# Patient Record
Sex: MALE | Race: WHITE | Employment: OTHER | URBAN - METROPOLITAN AREA
[De-identification: names, ages, dates, MRNs, and addresses within clinical notes are randomized per-mention and may not be internally consistent; named-entity substitution may affect disease eponyms.]

---

## 2019-07-25 ENCOUNTER — APPOINTMENT (OUTPATIENT)
Dept: CT IMAGING | Age: 64
DRG: 066 | End: 2019-07-25
Attending: EMERGENCY MEDICINE
Payer: COMMERCIAL

## 2019-07-25 ENCOUNTER — HOSPITAL ENCOUNTER (INPATIENT)
Age: 64
LOS: 2 days | Discharge: HOME HEALTH | DRG: 066 | End: 2019-07-27
Attending: EMERGENCY MEDICINE | Admitting: HOSPITALIST
Payer: COMMERCIAL

## 2019-07-25 ENCOUNTER — APPOINTMENT (OUTPATIENT)
Dept: MRI IMAGING | Age: 64
DRG: 066 | End: 2019-07-25
Attending: EMERGENCY MEDICINE
Payer: COMMERCIAL

## 2019-07-25 DIAGNOSIS — I63.9 CEREBROVASCULAR ACCIDENT (CVA), UNSPECIFIED MECHANISM (HCC): Primary | ICD-10-CM

## 2019-07-25 PROBLEM — F14.10 COCAINE ABUSE (HCC): Status: ACTIVE | Noted: 2019-07-25

## 2019-07-25 LAB
ALBUMIN SERPL-MCNC: 3.7 G/DL (ref 3.4–5)
ALBUMIN/GLOB SERPL: 1.1 {RATIO} (ref 0.8–1.7)
ALP SERPL-CCNC: 72 U/L (ref 45–117)
ALT SERPL-CCNC: 16 U/L (ref 16–61)
AMPHET UR QL SCN: NEGATIVE
ANION GAP SERPL CALC-SCNC: 4 MMOL/L (ref 3–18)
APPEARANCE UR: CLEAR
AST SERPL-CCNC: 13 U/L (ref 10–38)
ATRIAL RATE: 70 BPM
BARBITURATES UR QL SCN: NEGATIVE
BASOPHILS # BLD: 0 K/UL (ref 0–0.1)
BASOPHILS NFR BLD: 0 % (ref 0–2)
BENZODIAZ UR QL: NEGATIVE
BILIRUB SERPL-MCNC: 0.2 MG/DL (ref 0.2–1)
BILIRUB UR QL: NEGATIVE
BUN SERPL-MCNC: 28 MG/DL (ref 7–18)
BUN/CREAT SERPL: 27 (ref 12–20)
CALCIUM SERPL-MCNC: 9.1 MG/DL (ref 8.5–10.1)
CALCULATED P AXIS, ECG09: 43 DEGREES
CALCULATED R AXIS, ECG10: 15 DEGREES
CALCULATED T AXIS, ECG11: 40 DEGREES
CANNABINOIDS UR QL SCN: NEGATIVE
CHLORIDE SERPL-SCNC: 111 MMOL/L (ref 100–111)
CHOLEST SERPL-MCNC: 155 MG/DL
CK MB CFR SERPL CALC: NORMAL % (ref 0–4)
CK MB SERPL-MCNC: <1 NG/ML (ref 5–25)
CK SERPL-CCNC: 49 U/L (ref 39–308)
CO2 SERPL-SCNC: 27 MMOL/L (ref 21–32)
COCAINE UR QL SCN: POSITIVE
COLOR UR: YELLOW
CREAT SERPL-MCNC: 1.02 MG/DL (ref 0.6–1.3)
DIAGNOSIS, 93000: NORMAL
DIFFERENTIAL METHOD BLD: ABNORMAL
EOSINOPHIL # BLD: 0.2 K/UL (ref 0–0.4)
EOSINOPHIL NFR BLD: 2 % (ref 0–5)
ERYTHROCYTE [DISTWIDTH] IN BLOOD BY AUTOMATED COUNT: 14.9 % (ref 11.6–14.5)
ETHANOL SERPL-MCNC: <3 MG/DL (ref 0–3)
GLOBULIN SER CALC-MCNC: 3.5 G/DL (ref 2–4)
GLUCOSE BLD STRIP.AUTO-MCNC: 209 MG/DL (ref 70–110)
GLUCOSE SERPL-MCNC: 103 MG/DL (ref 74–99)
GLUCOSE UR STRIP.AUTO-MCNC: NEGATIVE MG/DL
HBA1C MFR BLD: 7 % (ref 4.2–5.6)
HCT VFR BLD AUTO: 46.1 % (ref 36–48)
HDLC SERPL-MCNC: 56 MG/DL (ref 40–60)
HDLC SERPL: 2.8 {RATIO} (ref 0–5)
HDSCOM,HDSCOM: ABNORMAL
HGB BLD-MCNC: 15.2 G/DL (ref 13–16)
HGB UR QL STRIP: NEGATIVE
KETONES UR QL STRIP.AUTO: NEGATIVE MG/DL
LDLC SERPL CALC-MCNC: 76 MG/DL (ref 0–100)
LEUKOCYTE ESTERASE UR QL STRIP.AUTO: NEGATIVE
LIPID PROFILE,FLP: NORMAL
LYMPHOCYTES # BLD: 2.6 K/UL (ref 0.9–3.6)
LYMPHOCYTES NFR BLD: 30 % (ref 21–52)
MCH RBC QN AUTO: 28 PG (ref 24–34)
MCHC RBC AUTO-ENTMCNC: 33 G/DL (ref 31–37)
MCV RBC AUTO: 85.1 FL (ref 74–97)
METHADONE UR QL: NEGATIVE
MONOCYTES # BLD: 0.7 K/UL (ref 0.05–1.2)
MONOCYTES NFR BLD: 8 % (ref 3–10)
NEUTS SEG # BLD: 5.2 K/UL (ref 1.8–8)
NEUTS SEG NFR BLD: 60 % (ref 40–73)
NITRITE UR QL STRIP.AUTO: NEGATIVE
OPIATES UR QL: NEGATIVE
P-R INTERVAL, ECG05: 152 MS
PCP UR QL: NEGATIVE
PH UR STRIP: 5 [PH] (ref 5–8)
PLATELET # BLD AUTO: 213 K/UL (ref 135–420)
PMV BLD AUTO: 11.7 FL (ref 9.2–11.8)
POTASSIUM SERPL-SCNC: 4.4 MMOL/L (ref 3.5–5.5)
PROT SERPL-MCNC: 7.2 G/DL (ref 6.4–8.2)
PROT UR STRIP-MCNC: NEGATIVE MG/DL
Q-T INTERVAL, ECG07: 388 MS
QRS DURATION, ECG06: 84 MS
QTC CALCULATION (BEZET), ECG08: 419 MS
RBC # BLD AUTO: 5.42 M/UL (ref 4.7–5.5)
SODIUM SERPL-SCNC: 142 MMOL/L (ref 136–145)
SP GR UR REFRACTOMETRY: 1.02 (ref 1–1.03)
TRIGL SERPL-MCNC: 115 MG/DL (ref ?–150)
TROPONIN I SERPL-MCNC: <0.02 NG/ML (ref 0–0.04)
UROBILINOGEN UR QL STRIP.AUTO: 1 EU/DL (ref 0.2–1)
VENTRICULAR RATE, ECG03: 70 BPM
VLDLC SERPL CALC-MCNC: 23 MG/DL
WBC # BLD AUTO: 8.7 K/UL (ref 4.6–13.2)

## 2019-07-25 PROCEDURE — 96360 HYDRATION IV INFUSION INIT: CPT

## 2019-07-25 PROCEDURE — 83036 HEMOGLOBIN GLYCOSYLATED A1C: CPT

## 2019-07-25 PROCEDURE — 82550 ASSAY OF CK (CPK): CPT

## 2019-07-25 PROCEDURE — 99285 EMERGENCY DEPT VISIT HI MDM: CPT

## 2019-07-25 PROCEDURE — 80061 LIPID PANEL: CPT

## 2019-07-25 PROCEDURE — 81003 URINALYSIS AUTO W/O SCOPE: CPT

## 2019-07-25 PROCEDURE — 74011636320 HC RX REV CODE- 636/320: Performed by: EMERGENCY MEDICINE

## 2019-07-25 PROCEDURE — 74011636637 HC RX REV CODE- 636/637: Performed by: HOSPITALIST

## 2019-07-25 PROCEDURE — 85025 COMPLETE CBC W/AUTO DIFF WBC: CPT

## 2019-07-25 PROCEDURE — 80307 DRUG TEST PRSMV CHEM ANLYZR: CPT

## 2019-07-25 PROCEDURE — 70496 CT ANGIOGRAPHY HEAD: CPT

## 2019-07-25 PROCEDURE — 74011250636 HC RX REV CODE- 250/636: Performed by: EMERGENCY MEDICINE

## 2019-07-25 PROCEDURE — 74011250636 HC RX REV CODE- 250/636: Performed by: HOSPITALIST

## 2019-07-25 PROCEDURE — 93005 ELECTROCARDIOGRAM TRACING: CPT

## 2019-07-25 PROCEDURE — 70551 MRI BRAIN STEM W/O DYE: CPT

## 2019-07-25 PROCEDURE — 70450 CT HEAD/BRAIN W/O DYE: CPT

## 2019-07-25 PROCEDURE — 65660000000 HC RM CCU STEPDOWN

## 2019-07-25 PROCEDURE — 80053 COMPREHEN METABOLIC PANEL: CPT

## 2019-07-25 PROCEDURE — 82962 GLUCOSE BLOOD TEST: CPT

## 2019-07-25 PROCEDURE — 74011250637 HC RX REV CODE- 250/637: Performed by: EMERGENCY MEDICINE

## 2019-07-25 RX ORDER — INSULIN LISPRO 100 [IU]/ML
INJECTION, SOLUTION INTRAVENOUS; SUBCUTANEOUS
COMMUNITY
Start: 2017-05-02

## 2019-07-25 RX ORDER — IBUPROFEN 200 MG
1 TABLET ORAL DAILY
Status: DISCONTINUED | OUTPATIENT
Start: 2019-07-26 | End: 2019-07-27 | Stop reason: HOSPADM

## 2019-07-25 RX ORDER — SODIUM CHLORIDE 9 MG/ML
100 INJECTION, SOLUTION INTRAVENOUS CONTINUOUS
Status: DISCONTINUED | OUTPATIENT
Start: 2019-07-25 | End: 2019-07-27 | Stop reason: HOSPADM

## 2019-07-25 RX ORDER — GUAIFENESIN 100 MG/5ML
81 LIQUID (ML) ORAL DAILY
Status: DISCONTINUED | OUTPATIENT
Start: 2019-07-26 | End: 2019-07-26

## 2019-07-25 RX ORDER — INSULIN LISPRO 100 [IU]/ML
INJECTION, SOLUTION INTRAVENOUS; SUBCUTANEOUS
Status: DISCONTINUED | OUTPATIENT
Start: 2019-07-25 | End: 2019-07-27 | Stop reason: HOSPADM

## 2019-07-25 RX ORDER — ASPIRIN 325 MG
325 TABLET ORAL
Status: COMPLETED | OUTPATIENT
Start: 2019-07-25 | End: 2019-07-25

## 2019-07-25 RX ORDER — ACETAMINOPHEN 500 MG
1000 TABLET ORAL ONCE
Status: COMPLETED | OUTPATIENT
Start: 2019-07-25 | End: 2019-07-25

## 2019-07-25 RX ORDER — ATORVASTATIN CALCIUM 20 MG/1
80 TABLET, FILM COATED ORAL DAILY
Status: DISCONTINUED | OUTPATIENT
Start: 2019-07-26 | End: 2019-07-27 | Stop reason: HOSPADM

## 2019-07-25 RX ORDER — ENOXAPARIN SODIUM 100 MG/ML
40 INJECTION SUBCUTANEOUS EVERY 24 HOURS
Status: DISCONTINUED | OUTPATIENT
Start: 2019-07-25 | End: 2019-07-27 | Stop reason: HOSPADM

## 2019-07-25 RX ORDER — MAGNESIUM SULFATE 100 %
16 CRYSTALS MISCELLANEOUS AS NEEDED
Status: DISCONTINUED | OUTPATIENT
Start: 2019-07-25 | End: 2019-07-27 | Stop reason: HOSPADM

## 2019-07-25 RX ORDER — INSULIN LISPRO 100 [IU]/ML
5 INJECTION, SOLUTION INTRAVENOUS; SUBCUTANEOUS
Status: DISCONTINUED | OUTPATIENT
Start: 2019-07-25 | End: 2019-07-27 | Stop reason: HOSPADM

## 2019-07-25 RX ADMIN — ACETAMINOPHEN 1000 MG: 500 TABLET ORAL at 14:55

## 2019-07-25 RX ADMIN — ENOXAPARIN SODIUM 40 MG: 40 INJECTION SUBCUTANEOUS at 19:02

## 2019-07-25 RX ADMIN — IOPAMIDOL 100 ML: 755 INJECTION, SOLUTION INTRAVENOUS at 17:09

## 2019-07-25 RX ADMIN — INSULIN LISPRO 4 UNITS: 100 INJECTION, SOLUTION INTRAVENOUS; SUBCUTANEOUS at 21:46

## 2019-07-25 RX ADMIN — SODIUM CHLORIDE 1000 ML: 900 INJECTION, SOLUTION INTRAVENOUS at 13:16

## 2019-07-25 RX ADMIN — SODIUM CHLORIDE 100 ML/HR: 900 INJECTION, SOLUTION INTRAVENOUS at 19:02

## 2019-07-25 RX ADMIN — ASPIRIN 325 MG ORAL TABLET 325 MG: 325 PILL ORAL at 15:04

## 2019-07-25 NOTE — PROGRESS NOTES
Received call from Zuri E Joel Mercedes MD who states \"No large vessel occlusion. \"  Primary RNBrandan Arm made aware.

## 2019-07-25 NOTE — ED NOTES
NEUROLOGY PRELIMINARY NOTE    Patient: Sergei Myrick        Sex: male          DOA: 7/25/2019  YOB: 1955      Age:  58 y.o.         LOS: 0 days       Comment: Discussed the case with the attending, Dr. Gibson Key. The patient seems to have embolic infarcts in several distributions. This may well be due to cocaine use. Initial recommendations:  Please use stroke admission order sets. 1. Aspirin 325mg po now, and then Aspirin 81mg po daily afterwards. 2. Admit to telemetry with tele monitoring  3. Neuro checks per stroke protocol  4. Permissive hypertension (do not treat if BP is not >200/110, prefer prn labetolol 5mg)  5. IV normal saline continuous infusion 100-125 ml/h  6. Euglycemia with sliding scale insulin  7. Euthermia with acetaminophen 650mg Q6h prn for fever  8. Avoid urinary catheters please. 9. CTA of head and neck  10. Echocardiogram with bubble study  11. Lipid panel  12. Hemoglobin A1c  13. SCDs and DVT prophylaxis   14. PT/OT and SLP consults    Further recommendations to follow.     Signed:  Maryse Elizabeth MD  7/25/2019  4:51 PM

## 2019-07-25 NOTE — ROUTINE PROCESS
TRANSFER - IN REPORT:    Verbal report received from Mark HAND RN(name) on Terrell Peed  being received from Halie CHAVARRIA RN(unit) for routine progression of care      Report consisted of patients Situation, Background, Assessment and   Recommendations(SBAR). Information from the following report(s) SBAR, Kardex, ED Summary, Procedure Summary, Intake/Output, MAR and Accordion was reviewed with the receiving nurse. Opportunity for questions and clarification was provided. Assessment completed upon patients arrival to unit and care assumed.

## 2019-07-25 NOTE — H&P
History & Physical    Patient: Roosevelt Wilkins MRN: [id number]  CSN: [id number]    YOB: 1955  Age: 58 y.o. Sex: male      DOA: 7/25/2019  Primary Care Provider:  Camila Calderón MD      Assessment/Plan     Patient Active Problem List   Diagnosis Code    Stroke ([facility] [address] I63.9    Diabetes ([facility] [address] E11.9    Cocaine abuse ([facility] [address] F14.10       Admit to telemetry  Acute cerebellar CVA MRI showed Several foci of acute infarction in the right cerebellar hemisphere and  cerebellar vermis with an additional focus in the right thalamus and a small focus in the left frontal lobe. CTA of head and neck  Echo 2d doppler   Get fasting lipid panel, HbA1c   Continue with antiplatelet agent   Will use antihypertensive only if blood pressure systolic is above 436 and diastolic above 910   Regular neurochecks q4 hrly   Start the patient on iv fluids   Will keep the patient on telemetry to rule out any arrythmias     DM - continue levemir, pre meal insulin, start on diabetic diet and SSI. Cocaine abuse - counseled. DVT prophylaxis    PT/OT      Estimated length of stay : 1-2 days    CC: dizziness, ataxia       HPI:     Roosevelt Wilkins is a 58 y.o. male who has past history of DM, presents to ER with concerns of dizziness and vertigo along with gait problem. Symptoms started about a week ago. He woke up disoriented and confused, he had fall and had to crawl to bathroom, where he vomited 3 times. Other symptoms include difficulty walking, nausea and headache. He reports that he used crack cocaine for symptoms but did not help. He denies any fever/chills, chest pain, SOB. Denies unilateral weakness or numbness. Smokes 2ppd. In ER CT showed acute/subacute CVA involving cerebellar area. Past Medical History:   Diagnosis Date    Diabetes ([facility] [address]     Stroke ([facility] [address]        No past surgical history on file. No family history on file.     Social History     Socioeconomic History    Marital status: SINGLE Spouse name: Not on file    Number of children: Not on file    Years of education: Not on file    Highest education level: Not on file   Tobacco Use    Smoking status: Current Every Day Smoker     Packs/day: 3.00    Smokeless tobacco: Never Used   Substance and Sexual Activity    Alcohol use: Never     Frequency: Never    Drug use: Yes     Types: Cocaine       Prior to Admission medications    Medication Sig Start Date End Date Taking? Authorizing Provider   insulin lispro (HUMALOG KWIKPEN INSULIN) 100 unit/mL kwikpen inject 5 units subcutaneously before meals 17  Yes Other, MD Francisco J   insulin detemir U-100 (LEVEMIR FLEXTOUCH U-100 INSULN) 100 unit/mL (3 mL) inpn inject 10 units subcutaneously every evening 17  Yes Other, MD Francisco J       No Known Allergies    Review of Systems  Gen: No fever, chills, malaise, weight loss/gain. Heent: No headache, rhinorrhea, epistaxis, ear pain, hearing loss, sinus pain, neck pain/stiffness, sore throat. Heart: No chest pain, palpitations, WATSON, pnd, or orthopnea. Resp: No cough, hemoptysis, wheezing and shortness of breath. GI: No nausea, vomiting, diarrhea, constipation, melena or hematochezia. : No urinary obstruction, dysuria or hematuria. Derm: No rash, new skin lesion or pruritis. Musc/skeletal: no bone or joint complains. Vasc: No edema, cyanosis or claudication. Endo: No heat/cold intolerance, no polyuria,polydipsia or polyphagia. Neuro: see above   Heme: No easy bruising or bleeding. Physical Exam:     Physical Exam:  Visit Vitals  /72 (BP 1 Location: Right arm)   Pulse 74   Temp 98.1 °F (36.7 °C)   Resp 18   Ht 5' 9\" (1.753 m)   Wt 86.2 kg (190 lb)   SpO2 98%   BMI 28.06 kg/m²      O2 Device: Room air    Temp (24hrs), Av.1 °F (36.7 °C), Min:97.9 °F (36.6 °C), Max:98.4 °F (36.9 °C)     07 -  1900  In: 1000 [I.V.:1000]  Out: 300 [Urine:300]   No intake/output data recorded.     General:  Awake, cooperative, no distress. Head:  Normocephalic, without obvious abnormality, atraumatic. Eyes:  Conjunctivae/corneas clear, sclera anicteric, PERRL, EOMs intact. Nose: Nares normal. No drainage or sinus tenderness. Throat: Lips, mucosa, and tongue normal.    Neck: Supple, symmetrical, trachea midline, no adenopathy. Lungs:   Clear to auscultation bilaterally. Heart:   S1, S2, no murmur, click, rub or gallop. Abdomen: Soft, non-tender. Bowel sounds normal. No masses,  No organomegaly. Extremities: Extremities normal, atraumatic, no cyanosis or edema. Capillary refill normal.   Pulses: 2+ and symmetric all extremities. Skin: Skin color pink, turgor normal. No rashes or lesions         Neurological Exam:   Mental Status:  Alert , co-operative , memory intact . Cranial Nerves:  Intact visual fields. PERRL, EOM's full, no nystagmus, no ptosis. Facial sensation is normal. Facial movement is symmetric. Palate is midline. Normal sternocleidomastoid strength. Tongue is midline. Hearing is intact bilaterally. Motor:  5/5 strength in upper and lower proximal and distal muscles. Normal bulk and tone. Reflexes:  Deep tendon reflexes 2+/4 and symmetrical.    Sensory:  Normal and symmetric bilaterally. Gait:  Not tested. Cerebellar:  No cerebellar signs present.             Labs Reviewed:    CMP:   Lab Results   Component Value Date/Time     07/25/2019 12:20 PM    K 4.4 07/25/2019 12:20 PM     07/25/2019 12:20 PM    CO2 27 07/25/2019 12:20 PM    AGAP 4 07/25/2019 12:20 PM     (H) 07/25/2019 12:20 PM    BUN 28 (H) 07/25/2019 12:20 PM    CREA 1.02 07/25/2019 12:20 PM    GFRAA >60 07/25/2019 12:20 PM    GFRNA >60 07/25/2019 12:20 PM    CA 9.1 07/25/2019 12:20 PM    ALB 3.7 07/25/2019 12:20 PM    TP 7.2 07/25/2019 12:20 PM    GLOB 3.5 07/25/2019 12:20 PM    AGRAT 1.1 07/25/2019 12:20 PM    SGOT 13 07/25/2019 12:20 PM    ALT 16 07/25/2019 12:20 PM     CBC:   Lab Results   Component Value Date/Time    WBC 8.7 07/25/2019 12:20 PM    HGB 15.2 07/25/2019 12:20 PM    HCT 46.1 07/25/2019 12:20 PM     07/25/2019 12:20 PM     All Cardiac Markers in the last 24 hours:   Lab Results   Component Value Date/Time    CPK 49 07/25/2019 12:20 PM    CKMB <1.0 07/25/2019 12:20 PM    CKND1  07/25/2019 12:20 PM     CALCULATION NOT PERFORMED WHEN RESULT IS BELOW LINEAR LIMIT    Sushma Sparks <0.02 07/25/2019 12:20 PM         Procedures/imaging: see electronic medical records for all procedures/Xrays and details which were not copied into this note but were reviewed prior to creation of Plan        CC: Camila Calderón MD

## 2019-07-25 NOTE — ED TRIAGE NOTES
Patient with complaints of a headache, dizziness and falling to the left side for 1 week.  Patient states that he was sent here by PCP

## 2019-07-25 NOTE — ED PROVIDER NOTES
EMERGENCY DEPARTMENT HISTORY AND PHYSICAL EXAM    Date: 7/25/2019  Patient Name: Leticia Pond    History of Presenting Illness     Chief Complaint   Patient presents with    Gait Problem    Dizziness         History Provided By: Patient      Leticia Pond is a 58 y.o. male with PMHX of diabetes who presents to the emergency department C/O dizziness and vision spinning. Patient notes that this started 1 week ago it caused him to fall he notes that nothing changes it that the room is constantly spinning. It does not get better. He notes that he did try to use crack cocaine which helped the headache but did not improve the spinning. He has never had anything like this before. Nothing makes it better or worse. He denies any associated shortness of breath or chest pain he endorses nausea but denies vomiting he denies urinary symptoms he notes when he walks he has a slight lean to the left but denies weakness. He otherwise denies urinary symptoms alcohol or other drug use. Gadiel Salinas PCP: Galileo Whitaker MD    Current Outpatient Medications   Medication Sig Dispense Refill    insulin lispro (HUMALOG KWIKPEN INSULIN) 100 unit/mL kwikpen inject 5 units subcutaneously before meals      insulin detemir U-100 (LEVEMIR FLEXTOUCH U-100 INSULN) 100 unit/mL (3 mL) inpn inject 10 units subcutaneously every evening         Past History     Past Medical History:  Past Medical History:   Diagnosis Date    Diabetes ([address]     Stroke [facility])        Past Surgical History:  No past surgical history on file. Family History:  No family history on file. Social History:  Social History     Tobacco Use    Smoking status: Current Every Day Smoker    Smokeless tobacco: Never Used   Substance Use Topics    Alcohol use: Never     Frequency: Never    Drug use: Not on file       Allergies:  No Known Allergies      Review of Systems   Review of Systems   All other systems reviewed and are negative.     Negative unless stated above    Physical Exam     Vitals:    07/25/19 1300 07/25/19 1400 07/25/19 1457 07/25/19 1539   BP: 136/79 (!) 172/92 163/82 144/74   Pulse: 66 64 64    Resp: 17 13 18    Temp:       SpO2:       Weight:       Height:         Physical Exam    Nursing notes and vital signs reviewed    Constitutional: Chronically ill-appearing moderate distress  Head: Normocephalic, Atraumatic  Eyes: Pupils are equal, round, and reactive to light, EOMI no evidence of nystagmus  Neck: Supple  Cardiovascular: Regular rate and rhythm,   Chest: Normal work of breathing and chest excursion bilaterally  Lungs: Clear to ausculation bilaterally  Abdomen: Soft, non tender, non distended, normoactive bowel sounds  Back: No evidence of trauma or deformity  Extremities: No evidence of trauma or deformity, no LE edema  Skin: Warm and dry, normal cap refill  Neuro: Alert and appropriate, normal cranial nerve exam normal speech, strength and sensation full and symmetric bilaterally, gait with a slight lean to the left when walking, normal finger-nose-finger abnormal heel shin  Psychiatric: Normal mood and affect      Diagnostic Study Results     Labs -     Recent Results (from the past 12 hour(s))   EKG, 12 LEAD, INITIAL    Collection Time: 07/25/19 12:19 PM   Result Value Ref Range    Ventricular Rate 70 BPM    Atrial Rate 70 BPM    P-R Interval 152 ms    QRS Duration 84 ms    Q-T Interval 388 ms    QTC Calculation (Bezet) 419 ms    Calculated P Axis 43 degrees    Calculated R Axis 15 degrees    Calculated T Axis 40 degrees    Diagnosis       Normal sinus rhythm with sinus arrhythmia  Normal ECG  No previous ECGs available     CBC WITH AUTOMATED DIFF    Collection Time: 07/25/19 12:20 PM   Result Value Ref Range    WBC 8.7 4.6 - 13.2 K/uL    RBC 5.42 4.70 - 5.50 M/uL    HGB 15.2 13.0 - 16.0 g/dL    HCT 46.1 36.0 - 48.0 %    MCV 85.1 74.0 - 97.0 FL    MCH 28.0 24.0 - 34.0 PG    MCHC 33.0 31.0 - 37.0 g/dL    RDW 14.9 (H) 11.6 - 14.5 %    PLATELET 772 135 - 420 K/uL    MPV 11.7 9.2 - 11.8 FL    NEUTROPHILS 60 40 - 73 %    LYMPHOCYTES 30 21 - 52 %    MONOCYTES 8 3 - 10 %    EOSINOPHILS 2 0 - 5 %    BASOPHILS 0 0 - 2 %    ABS. NEUTROPHILS 5.2 1.8 - 8.0 K/UL    ABS. LYMPHOCYTES 2.6 0.9 - 3.6 K/UL    ABS. MONOCYTES 0.7 0.05 - 1.2 K/UL    ABS. EOSINOPHILS 0.2 0.0 - 0.4 K/UL    ABS. BASOPHILS 0.0 0.0 - 0.1 K/UL    DF AUTOMATED     METABOLIC PANEL, COMPREHENSIVE    Collection Time: 07/25/19 12:20 PM   Result Value Ref Range    Sodium 142 136 - 145 mmol/L    Potassium 4.4 3.5 - 5.5 mmol/L    Chloride 111 100 - 111 mmol/L    CO2 27 21 - 32 mmol/L    Anion gap 4 3.0 - 18 mmol/L    Glucose 103 (H) 74 - 99 mg/dL    BUN 28 (H) 7.0 - 18 MG/DL    Creatinine 1.02 0.6 - 1.3 MG/DL    BUN/Creatinine ratio 27 (H) 12 - 20      GFR est AA >60 >60 ml/min/1.73m2    GFR est non-AA >60 >60 ml/min/1.73m2    Calcium 9.1 8.5 - 10.1 MG/DL    Bilirubin, total 0.2 0.2 - 1.0 MG/DL    ALT (SGPT) 16 16 - 61 U/L    AST (SGOT) 13 10 - 38 U/L    Alk.  phosphatase 72 45 - 117 U/L    Protein, total 7.2 6.4 - 8.2 g/dL    Albumin 3.7 3.4 - 5.0 g/dL    Globulin 3.5 2.0 - 4.0 g/dL    A-G Ratio 1.1 0.8 - 1.7     CARDIAC PANEL,(CK, CKMB & TROPONIN)    Collection Time: 07/25/19 12:20 PM   Result Value Ref Range    CK 49 39 - 308 U/L    CK - MB <1.0 <3.6 ng/ml    CK-MB Index  0.0 - 4.0 %     CALCULATION NOT PERFORMED WHEN RESULT IS BELOW LINEAR LIMIT    Troponin-I, QT <0.02 0.0 - 0.045 NG/ML   ETHYL ALCOHOL    Collection Time: 07/25/19 12:20 PM   Result Value Ref Range    ALCOHOL(ETHYL),SERUM <3 0 - 3 MG/DL   URINALYSIS W/ RFLX MICROSCOPIC    Collection Time: 07/25/19  1:14 PM   Result Value Ref Range    Color YELLOW      Appearance CLEAR      Specific gravity 1.022 1.005 - 1.030      pH (UA) 5.0 5.0 - 8.0      Protein NEGATIVE  NEG mg/dL    Glucose NEGATIVE  NEG mg/dL    Ketone NEGATIVE  NEG mg/dL    Bilirubin NEGATIVE  NEG      Blood NEGATIVE  NEG      Urobilinogen 1.0 0.2 - 1.0 EU/dL    Nitrites NEGATIVE NEG      Leukocyte Esterase NEGATIVE  NEG     DRUG SCREEN, URINE    Collection Time: 07/25/19  1:14 PM   Result Value Ref Range    BENZODIAZEPINES NEGATIVE  NEG      BARBITURATES NEGATIVE  NEG      THC (TH-CANNABINOL) NEGATIVE  NEG      OPIATES NEGATIVE  NEG      PCP(PHENCYCLIDINE) NEGATIVE  NEG      COCAINE POSITIVE (A) NEG      AMPHETAMINES NEGATIVE  NEG      METHADONE NEGATIVE  NEG      HDSCOM (NOTE)        Radiologic Studies -   CT HEAD WO CONT   Final Result   IMPRESSION:      1. Acute/subacute focal area of loss of the gray-white matter junction is   present in the medial posterior aspect of the right cerebellum, concerning for   evolving infarct. No acute intracranial hemorrhage. Recommend MRI of the brain   for further evaluation. MRI BRAIN WO CONT    (Results Pending)   CTA HEAD NECK W WO CONT    (Results Pending)     CT Results  (Last 48 hours)               07/25/19 1422  CT HEAD WO CONT Final result    Impression:  IMPRESSION:       1. Acute/subacute focal area of loss of the gray-white matter junction is   present in the medial posterior aspect of the right cerebellum, concerning for   evolving infarct. No acute intracranial hemorrhage. Recommend MRI of the brain   for further evaluation. Narrative:  EXAM: CT head       INDICATION: Headache and dizziness for one week. COMPARISON: None. TECHNIQUE: Axial CT imaging of the head was performed without intravenous   contrast. One or more dose reduction techniques were used on this CT: automated   exposure control, adjustment of the mAs and/or kVp according to patient size,   and iterative reconstruction techniques. The specific techniques used on this   CT exam have been documented in the patient's electronic medical record.     Digital Imaging and Communications in Medicine (DICOM) format image data are   available to nonaffiliated external healthcare facilities or entities on a   secured, media free, reciprocally searchable basis with patient authorization   for at least a 12-month period after this study. _______________       FINDINGS:       BRAIN AND POSTERIOR FOSSA: There is no intracranial hemorrhage, mass effect, or   midline shift. Acute/subacute appearing focal area of loss of gray-white matter   junction is present in the medial posterior aspect of the right cerebellum. EXTRA-AXIAL SPACES AND MENINGES: There are no abnormal extra-axial fluid   collections. CALVARIUM: Intact. SINUSES: Clear. OTHER: None.       _______________               CXR Results  (Last 48 hours)    None          Medications given in the ED-  Medications   sodium chloride 0.9 % bolus infusion 1,000 mL (0 mL IntraVENous IV Completed 7/25/19 1416)   acetaminophen (TYLENOL) tablet 1,000 mg (1,000 mg Oral Given 7/25/19 1455)   aspirin tablet 325 mg (325 mg Oral Given 7/25/19 1504)         Medical Decision Making   I am the first provider for this patient. I reviewed the vital signs, available nursing notes, past medical history, past surgical history, family history and social history. Vital Signs-Reviewed the patient's vital signs. Pulse Oximetry Analysis - 100% on ra     Cardiac Monitor:  Rate: 66 bpm  Rhythm: nsr    EKG interpretation: (Preliminary)  EKG read by Dr. Iwona Sosa 70 normal sinus rhythm    Records Reviewed: Nursing Notes, Old Medical Records and Previous Laboratory Studies    Provider Notes (Medical Decision Making): Cristy Love is a 58 y.o. male history of hypertension and substance abuse who presents today with possible cerebellar stroke symptoms. Patient is outside of the TPA window as symptoms started 1 week ago. CT scan was concerning for cerebellar stroke. I spoke to Dr. Elma Prieto of neurology who agreed and an MRI was ordered. MRI confirms stroke a full dose aspirin was given and a CTA was ordered per neurology recommendations patient was informed of the findings.   Laboratory work and EKG as above he will be admitted to the medical service under Dr. Ryder Ayers. For further treatment. Procedures:  Procedures      Diagnosis and Disposition       Core Measures:  For Hospitalized Patients:    1. Hospitalization Decision Time:  The decision to hospitalize the patient was made by Dr. Arelis Crowder on 7/25/2019    2. Aspirin: Aspirin was given    9:34 AM  Patient is being admitted to the hospital by Dr. Ryder Ayers. The results of their tests and reasons for their admission have been discussed with them and/or available family. They convey agreement and understanding for the need to be admitted and for their admission diagnosis. CONDITIONS ON ADMISSION:  Sepsis is not present at the time of admission. Deep Vein Thrombosis is not present at the time of admission. .  Wound infection is not present at the time of admission. Pressure Ulcer is not present at the time of admission. CLINICAL IMPRESSION:    1. Cerebrovascular accident (CVA), unspecified mechanism ([facility] [address]      _______________________________      Please note that this dictation was completed with [employer], the computer voice recognition software. Quite often unanticipated grammatical, syntax, homophones, and other interpretive errors are inadvertently transcribed by the computer software. Please disregard these errors. Please excuse any errors that have escaped final proofreading.

## 2019-07-26 ENCOUNTER — APPOINTMENT (OUTPATIENT)
Dept: NON INVASIVE DIAGNOSTICS | Age: 64
DRG: 066 | End: 2019-07-26
Attending: HOSPITALIST
Payer: COMMERCIAL

## 2019-07-26 LAB
ANION GAP SERPL CALC-SCNC: 9 MMOL/L (ref 3–18)
BUN SERPL-MCNC: 27 MG/DL (ref 7–18)
BUN/CREAT SERPL: 28 (ref 12–20)
CALCIUM SERPL-MCNC: 8.2 MG/DL (ref 8.5–10.1)
CHLORIDE SERPL-SCNC: 112 MMOL/L (ref 100–111)
CO2 SERPL-SCNC: 25 MMOL/L (ref 21–32)
CREAT SERPL-MCNC: 0.98 MG/DL (ref 0.6–1.3)
ECHO AO ROOT DIAM: 3.49 CM
ECHO AV AREA PEAK VELOCITY: 3 CM2
ECHO AV AREA VTI: 3.4 CM2
ECHO AV AREA/BSA PEAK VELOCITY: 1.5 CM2/M2
ECHO AV AREA/BSA VTI: 1.7 CM2/M2
ECHO AV MEAN GRADIENT: 5.5 MMHG
ECHO AV PEAK GRADIENT: 10.7 MMHG
ECHO AV PEAK VELOCITY: 163.49 CM/S
ECHO AV VTI: 32.64 CM
ECHO IVC PROX: 2.07 CM
ECHO LA MAJOR AXIS: 4.24 CM
ECHO LA VOL 2C: 83.72 ML (ref 18–58)
ECHO LA VOL 4C: 53.48 ML (ref 18–58)
ECHO LA VOL BP: 75.47 ML (ref 18–58)
ECHO LA VOL/BSA BIPLANE: 37.34 ML/M2 (ref 16–28)
ECHO LA VOLUME INDEX A2C: 41.42 ML/M2 (ref 16–28)
ECHO LA VOLUME INDEX A4C: 26.46 ML/M2 (ref 16–28)
ECHO LV E' LATERAL VELOCITY: 11 CM/S
ECHO LV E' SEPTAL VELOCITY: 10 CM/S
ECHO LV EDV A2C: 67.6 ML
ECHO LV EDV A4C: 86.6 ML
ECHO LV EDV BP: 83.6 ML (ref 67–155)
ECHO LV EDV INDEX A4C: 42.8 ML/M2
ECHO LV EDV INDEX BP: 41.4 ML/M2
ECHO LV EDV NDEX A2C: 33.4 ML/M2
ECHO LV EJECTION FRACTION A2C: 63 %
ECHO LV EJECTION FRACTION A4C: 59 %
ECHO LV EJECTION FRACTION BIPLANE: 60.8 % (ref 55–100)
ECHO LV ESV A2C: 25 ML
ECHO LV ESV A4C: 35.6 ML
ECHO LV ESV BP: 32.8 ML (ref 22–58)
ECHO LV ESV INDEX A2C: 12.4 ML/M2
ECHO LV ESV INDEX A4C: 17.6 ML/M2
ECHO LV ESV INDEX BP: 16.2 ML/M2
ECHO LV INTERNAL DIMENSION DIASTOLIC: 5.09 CM (ref 4.2–5.9)
ECHO LV INTERNAL DIMENSION SYSTOLIC: 2.93 CM
ECHO LV IVSD: 1.17 CM (ref 0.6–1)
ECHO LV MASS 2D: 275.5 G (ref 88–224)
ECHO LV MASS INDEX 2D: 136.3 G/M2 (ref 49–115)
ECHO LV POSTERIOR WALL DIASTOLIC: 1.16 CM (ref 0.6–1)
ECHO LVOT DIAM: 2.11 CM
ECHO LVOT PEAK GRADIENT: 7.9 MMHG
ECHO LVOT PEAK VELOCITY: 140.7 CM/S
ECHO LVOT VTI: 31.88 CM
ECHO MV A VELOCITY: 117.6 CM/S
ECHO MV AREA PHT: 4.7 CM2
ECHO MV E DECELERATION TIME (DT): 162 MS
ECHO MV E VELOCITY: 107.95 CM/S
ECHO MV E/A RATIO: 0.92
ECHO MV E/E' LATERAL: 9.81
ECHO MV E/E' RATIO (AVERAGED): 10.3
ECHO MV E/E' SEPTAL: 10.8
ECHO MV PRESSURE HALF TIME (PHT): 47 MS
ECHO PULMONARY ARTERY SYSTOLIC PRESSURE (PASP): 25 MMHG
ECHO PV MAX VELOCITY: 103.77 CM/S
ECHO PV PEAK GRADIENT: 4.3 MMHG
ECHO RA AREA 4C: 12.12 CM2
ECHO RV INTERNAL DIMENSION: 3.72 CM
ECHO TRICUSPID ANNULAR PEAK SYSTOLIC VELOCITY: 2.9 CM/S
ECHO TV REGURGITANT MAX VELOCITY: 225.77 CM/S
ECHO TV REGURGITANT PEAK GRADIENT: 20.4 MMHG
ERYTHROCYTE [DISTWIDTH] IN BLOOD BY AUTOMATED COUNT: 14.8 % (ref 11.6–14.5)
GLUCOSE BLD STRIP.AUTO-MCNC: 127 MG/DL (ref 70–110)
GLUCOSE BLD STRIP.AUTO-MCNC: 193 MG/DL (ref 70–110)
GLUCOSE BLD STRIP.AUTO-MCNC: 211 MG/DL (ref 70–110)
GLUCOSE BLD STRIP.AUTO-MCNC: 261 MG/DL (ref 70–110)
GLUCOSE SERPL-MCNC: 208 MG/DL (ref 74–99)
HCT VFR BLD AUTO: 42.5 % (ref 36–48)
HGB BLD-MCNC: 13.7 G/DL (ref 13–16)
MCH RBC QN AUTO: 27.5 PG (ref 24–34)
MCHC RBC AUTO-ENTMCNC: 32.2 G/DL (ref 31–37)
MCV RBC AUTO: 85.2 FL (ref 74–97)
PLATELET # BLD AUTO: 173 K/UL (ref 135–420)
PMV BLD AUTO: 11.4 FL (ref 9.2–11.8)
POTASSIUM SERPL-SCNC: 4.2 MMOL/L (ref 3.5–5.5)
RBC # BLD AUTO: 4.99 M/UL (ref 4.7–5.5)
SODIUM SERPL-SCNC: 146 MMOL/L (ref 136–145)
WBC # BLD AUTO: 6.2 K/UL (ref 4.6–13.2)

## 2019-07-26 PROCEDURE — 74011000250 HC RX REV CODE- 250

## 2019-07-26 PROCEDURE — 85027 COMPLETE CBC AUTOMATED: CPT

## 2019-07-26 PROCEDURE — 74011250637 HC RX REV CODE- 250/637: Performed by: PSYCHIATRY & NEUROLOGY

## 2019-07-26 PROCEDURE — 74011000250 HC RX REV CODE- 250: Performed by: HOSPITALIST

## 2019-07-26 PROCEDURE — 82962 GLUCOSE BLOOD TEST: CPT

## 2019-07-26 PROCEDURE — 97161 PT EVAL LOW COMPLEX 20 MIN: CPT

## 2019-07-26 PROCEDURE — 36415 COLL VENOUS BLD VENIPUNCTURE: CPT

## 2019-07-26 PROCEDURE — 93306 TTE W/DOPPLER COMPLETE: CPT

## 2019-07-26 PROCEDURE — 65660000000 HC RM CCU STEPDOWN

## 2019-07-26 PROCEDURE — 80048 BASIC METABOLIC PNL TOTAL CA: CPT

## 2019-07-26 PROCEDURE — 74011250636 HC RX REV CODE- 250/636: Performed by: HOSPITALIST

## 2019-07-26 PROCEDURE — 74011636637 HC RX REV CODE- 636/637: Performed by: HOSPITALIST

## 2019-07-26 PROCEDURE — 74011250637 HC RX REV CODE- 250/637: Performed by: FAMILY MEDICINE

## 2019-07-26 PROCEDURE — 74011250637 HC RX REV CODE- 250/637: Performed by: HOSPITALIST

## 2019-07-26 RX ORDER — ASPIRIN 325 MG
325 TABLET, DELAYED RELEASE (ENTERIC COATED) ORAL DAILY
Status: DISCONTINUED | OUTPATIENT
Start: 2019-07-26 | End: 2019-07-27 | Stop reason: HOSPADM

## 2019-07-26 RX ORDER — SODIUM CHLORIDE 9 MG/ML
INJECTION INTRAMUSCULAR; INTRAVENOUS; SUBCUTANEOUS
Status: COMPLETED
Start: 2019-07-26 | End: 2019-07-26

## 2019-07-26 RX ORDER — ACETAMINOPHEN 325 MG/1
650 TABLET ORAL
Status: DISCONTINUED | OUTPATIENT
Start: 2019-07-26 | End: 2019-07-27 | Stop reason: HOSPADM

## 2019-07-26 RX ORDER — SODIUM CHLORIDE 9 MG/ML
30 INJECTION INTRAMUSCULAR; INTRAVENOUS; SUBCUTANEOUS ONCE
Status: COMPLETED | OUTPATIENT
Start: 2019-07-26 | End: 2019-07-26

## 2019-07-26 RX ORDER — INSULIN GLARGINE 100 [IU]/ML
10 INJECTION, SOLUTION SUBCUTANEOUS EVERY EVENING
Status: DISCONTINUED | OUTPATIENT
Start: 2019-07-26 | End: 2019-07-27 | Stop reason: HOSPADM

## 2019-07-26 RX ADMIN — INSULIN LISPRO 6 UNITS: 100 INJECTION, SOLUTION INTRAVENOUS; SUBCUTANEOUS at 23:34

## 2019-07-26 RX ADMIN — SODIUM CHLORIDE: 9 INJECTION, SOLUTION INTRAMUSCULAR; INTRAVENOUS; SUBCUTANEOUS at 11:00

## 2019-07-26 RX ADMIN — INSULIN LISPRO 3 UNITS: 100 INJECTION, SOLUTION INTRAVENOUS; SUBCUTANEOUS at 11:53

## 2019-07-26 RX ADMIN — ENOXAPARIN SODIUM 40 MG: 40 INJECTION SUBCUTANEOUS at 18:59

## 2019-07-26 RX ADMIN — ASPIRIN 325 MG: 325 TABLET, DELAYED RELEASE ORAL at 11:51

## 2019-07-26 RX ADMIN — INSULIN LISPRO 9 UNITS: 100 INJECTION, SOLUTION INTRAVENOUS; SUBCUTANEOUS at 06:33

## 2019-07-26 RX ADMIN — INSULIN GLARGINE 10 UNITS: 100 INJECTION, SOLUTION SUBCUTANEOUS at 18:59

## 2019-07-26 RX ADMIN — INSULIN LISPRO 5 UNITS: 100 INJECTION, SOLUTION INTRAVENOUS; SUBCUTANEOUS at 07:30

## 2019-07-26 RX ADMIN — ATORVASTATIN CALCIUM 80 MG: 20 TABLET, FILM COATED ORAL at 11:51

## 2019-07-26 RX ADMIN — SODIUM CHLORIDE 100 ML/HR: 900 INJECTION, SOLUTION INTRAVENOUS at 23:52

## 2019-07-26 RX ADMIN — SODIUM CHLORIDE 30 ML: 9 INJECTION, SOLUTION INTRAMUSCULAR; INTRAVENOUS; SUBCUTANEOUS at 13:26

## 2019-07-26 RX ADMIN — INSULIN LISPRO 5 UNITS: 100 INJECTION, SOLUTION INTRAVENOUS; SUBCUTANEOUS at 16:30

## 2019-07-26 RX ADMIN — ACETAMINOPHEN 650 MG: 325 TABLET ORAL at 13:27

## 2019-07-26 RX ADMIN — SODIUM CHLORIDE 100 ML/HR: 900 INJECTION, SOLUTION INTRAVENOUS at 04:44

## 2019-07-26 RX ADMIN — INSULIN LISPRO 5 UNITS: 100 INJECTION, SOLUTION INTRAVENOUS; SUBCUTANEOUS at 11:52

## 2019-07-26 NOTE — PROGRESS NOTES
Reason for Admission:   Domenica Reardon is a 58 y.o. male who has past history of DM, presents to ER with concerns of dizziness and vertigo along with gait problem. Symptoms started about a week ago. He woke up disoriented and confused, he had fall and had to crawl to bathroom, where he vomited 3 times. Other symptoms include difficulty walking, nausea and headache. He reports that he used crack cocaine for symptoms but did not help. He denies any fever/chills, chest pain, SOB. Denies unilateral weakness or numbness. Smokes 2ppd. In ER CT showed acute/subacute CVA involving cerebellar area.                       RRAT Score:   6                  Plan for utilizing home health:      yes                    Current Advanced Directive/Advance Care Plan: please consider placing consult to palliatiave/pastoral care to address ACP                         Transition of Care Plan:      Met with patient at bedside along with Dr. Minna Gates. Patient states he lives with his son. He has [employer] for insurance. States his pcp is  [facility]. Patient to be evaluated by PT. Iris Cunha Patients only c/o is dizziness. Reports he is usually independent. He denies other needs from cm he will need follow up appointments with pcp. Cm wlll continue to follow  Care Management Interventions  PCP Verified by CM:  Yes  Transition of Care Consult (CM Consult): Discharge Planning

## 2019-07-26 NOTE — CONSULTS
NEUROLOGY CONSULTATION NOTE    Patient: Vivian Lim MRN: [id number]  CSN: [id number]    YOB: 1955  Age: 58 y.o. Sex: male    DOA: 7/25/2019 LOS:  LOS: 1 day        Requesting Physician: Dr. Mandy Renteria  Reason for Consultation: cerebrovascular accident              HISTORY OF PRESENT ILLNESS:   Vivian Lim is a 71-year-old gentleman with history of diabetes, prior CVA with speech problems, depression and heavy tobacco use (3 ppd since he was 14), who presented to hospital with a week of dizziness and balance problems. He woke up with extreme dizziness and needed to vomit several times a week ago when the symptoms started. He was not able to walk study and had extreme dizziness. He also had some headaches. He denies lateralized weakness or numbness. He tells me that he used cocaine in order to help with the headaches after the symptoms started. He uses cocaine intermittently and last time he is cocaine was several weeks before the symptoms started. His prior CVA involved speech problems but he was not using cocaine at the time. His brain MRI showed right cerebellar infarct and multiple other cortical infarcts. Stroke Work-up:  Brain MRI: 1. Several foci of acute infarction in the right cerebellar hemisphere and cerebellar vermis with an additional focus in the right thalamus and a small focus in the left frontal lobe. 2.  Mild atrophy and chronic small vessel changes with old areas of infarction, as described above. CTA of head and neck: 1. No significant carotid stenosis. Mild stenosis origin left vertebral artery. 2. No evidence of large vessel intracranial occlusion. 3. Short segment filling defect and stenosis right PICA suggestive of likely thromboembolism and partial recanalization. Additional areas of stenosis involving proximal right inferior division M2 segment and right P2 segment posterior cerebral artery.  4. Multifocal infarcts right cerebellum and right thalamus. Echocardiogram:   Lipid panel:   Lab Results   Component Value Date/Time    Cholesterol, total 155 07/25/2019 12:20 PM    HDL Cholesterol 56 07/25/2019 12:20 PM    LDL, calculated 76 07/25/2019 12:20 PM    VLDL, calculated 23 07/25/2019 12:20 PM    Triglyceride 115 07/25/2019 12:20 PM    CHOL/HDL Ratio 2.8 07/25/2019 12:20 PM     HbA1c:   Lab Results   Component Value Date/Time    Hemoglobin A1c 7.0 (H) 07/25/2019 12:20 PM       PAST MEDICAL HISTORY:  Past Medical History:   Diagnosis Date    Diabetes ([facility] [address]     Stroke ([facility] [address]      PAST SURGICAL HISTORY:  No past surgical history on file. FAMILY HISTORY:  No family history on file. SOCIAL HISTORY:  Social History     Socioeconomic History    Marital status: SINGLE     Spouse name: Not on file    Number of children: Not on file    Years of education: Not on file    Highest education level: Not on file   Tobacco Use    Smoking status: Current Every Day Smoker     Packs/day: 3.00    Smokeless tobacco: Never Used   Substance and Sexual Activity    Alcohol use: Never     Frequency: Never    Drug use: Yes     Types: Cocaine     MEDICATIONS:  Current Facility-Administered Medications   Medication Dose Route Frequency    acetaminophen (TYLENOL) tablet 650 mg  650 mg Oral Q6H PRN    insulin lispro (HUMALOG) injection 5 Units  5 Units SubCUTAneous TIDAC    glucose chewable tablet 16 g  16 g Oral PRN    glucagon (GLUCAGEN) injection 1 mg  1 mg IntraMUSCular PRN    insulin lispro (HUMALOG) injection   SubCUTAneous AC&HS    aspirin chewable tablet 81 mg  81 mg Oral DAILY    atorvastatin (LIPITOR) tablet 80 mg  80 mg Oral DAILY    0.9% sodium chloride infusion  100 mL/hr IntraVENous CONTINUOUS    enoxaparin (LOVENOX) injection 40 mg  40 mg SubCUTAneous Q24H    nicotine (NICODERM CQ) 14 mg/24 hr patch 1 Patch  1 Patch TransDERmal DAILY     Prior to Admission medications    Medication Sig Start Date End Date Taking?  Authorizing Provider   insulin lispro (HUMALOG KWIKPEN INSULIN) 100 unit/mL kwikpen inject 5 units subcutaneously before meals 5/2/17  Yes Other, MD Francisco J   insulin detemir U-100 (LEVEMIR FLEXTOUCH U-100 INSULN) 100 unit/mL (3 mL) inpn inject 10 units subcutaneously every evening 4/28/17  Yes Other, MD Francisco J       ALLERGIES:  No Known Allergies    Review of Systems  GENERAL: No fevers or chills. HEENT: No change in vision, earache, tinnitus, sore throat or sinus congestion. NECK: No pain or stiffness. CARDIOVASCULAR: No chest pain or pressure. No palpitations. PULMONARY: No shortness of breath, cough or wheeze. GASTROINTESTINAL: No abdominal pain, nausea, vomiting or diarrhea. GENITOURINARY: No urinary frequency or dysuria. MUSCULOSKELETAL: No joint or muscle pain, no back pain, no recent trauma. DERMATOLOGIC: No rash, no itching, no lesions. ENDOCRINE: No heat or cold intolerance. HEMATOLOGICAL: No anemia or easy bruising or bleeding. NEUROLOGIC: No seizures, numbness, tingling or weakness. See HPI. PHYSICAL EXAMINATION:     Visit Vitals  /71 (BP 1 Location: Left arm, BP Patient Position: At rest)   Pulse (!) 52   Temp 98.7 °F (37.1 °C)   Resp 16   Ht 5' 9\" (1.753 m)   Wt 79 kg (174 lb 3.2 oz)   SpO2 99%   BMI 25.72 kg/m²      O2 Device: Room air  GENERAL: Pleasant, in no apparent distress. HEENT: Moist mucous membranes, sclerae anicteric, scalp is atraumatic. Edentulous. CVS: Regular rate and rhythm, no murmurs or gallops. No carotid bruits. PULMONARY: Clear to auscultation bilaterally. No rales or rhonchi. No wheezing. EXTREMITIES: Normal range of motion at all sites. No deformities. ABDOMEN: Soft, nontender. SKIN: No rashes or ecchymoses. Warm and dry. NEUROLOGIC: Alert and oriented x3. Speech is fluent without any aphasia. There is some dysarthria due to poor dentition  Cranial nerves: Face is symmetric with symmetric smile. Facial sensation is intact.  Extraocular movements are intact with no nystagmus. Visual fields are full to confrontation. PERRL. Tongue is midline. Palate elevates symmetrically. Hearing intact to speech. Sternocleidomastoid and trapezius strengths are full bilaterally. Motor: Normal tone and normal bulk on all four extremities. Strength is full on all four segmentally. There is no pronator drift or orbiting. Sensory: Intact to pinprick and touch on all four. Normal vibratory sensation on toes bilaterally. Coordination: Intact coordination with finger-nose-finger bilaterally. Normal fine movements. No bradykinesia detected. Deep tendon reflexes: 2+ at biceps, brachioradialis, patella and ankles bilaterally. Labs: Results:       Chemistry Recent Labs     07/26/19  0351 07/25/19  1220   * 103*   * 142   K 4.2 4.4   * 111   CO2 25 27   BUN 27* 28*   CREA 0.98 1.02   CA 8.2* 9.1   AGAP 9 4   BUCR 28* 27*   AP  --  72   TP  --  7.2   ALB  --  3.7   GLOB  --  3.5   AGRAT  --  1.1      CBC w/Diff Recent Labs     07/26/19  0351 07/25/19  1220   WBC 6.2 8.7   RBC 4.99 5.42   HGB 13.7 15.2   HCT 42.5 46.1    213   GRANS  --  60   LYMPH  --  30   EOS  --  2      Cardiac Enzymes Recent Labs     07/25/19  1220   CPK 49   CKND1 CALCULATION NOT PERFORMED WHEN RESULT IS BELOW LINEAR LIMIT      Coagulation No results for input(s): PTP, INR, APTT in the last 72 hours. No lab exists for component: INREXT    Lipid Panel Lab Results   Component Value Date/Time    Cholesterol, total 155 07/25/2019 12:20 PM    HDL Cholesterol 56 07/25/2019 12:20 PM    LDL, calculated 76 07/25/2019 12:20 PM    VLDL, calculated 23 07/25/2019 12:20 PM    Triglyceride 115 07/25/2019 12:20 PM    CHOL/HDL Ratio 2.8 07/25/2019 12:20 PM      BNP No results for input(s): BNPP in the last 72 hours.    Liver Enzymes Recent Labs     07/25/19  1220   TP 7.2   ALB 3.7   AP 72   SGOT 13      Thyroid Studies No results found for: T4, T3U, TSH, TSHEXT       Radiology:  Mri Brain Wo Cont    Result Date: 7/25/2019  EXAM: MRI brain without contrast 7/25/2019. CLINICAL INDICATION/HISTORY: Dizziness. Falling to the left for approximately 1 week. COMPARISON: CT scan of the head from 7/25/2019. TECHNIQUE: Multiplanar multisequential images of the brain were obtained without contrast. _______________ FINDINGS: BRAIN AND POSTERIOR FOSSA: Scattered foci of acute infarction are noted involving the inferior aspect of the right cerebellar hemisphere. Additional small foci are noted within the cerebellar vermis and along the posterior medial aspect of the right mid to upper cerebellum. There is also a small focus of acute infarct in the posterolateral aspect of the right thalamus. There is also a 6 mm elliptical area of high signal on the diffusion-weighted images in the high left frontal centrum semiovale, involving the medial subcortical white matter of the left frontal lobe (image 22). This area is also low in signal on the [facility] maps suggesting an additional focus of acute infarction. The disparate vascular distributions consistent with an embolic etiology. Mild atrophy and chronic small vessel changes are present. There is a probable small focus of old lacunar infarction in the central aspect of the right side of the trevor. An old focus of infarct is also present along the right thalamocapsular interface. There is no intracranial hemorrhage, mass effect, or midline shift. There are no significant additional areas of abnormal parenchymal signal. No additional foci of true restricted diffusion are identified. EXTRA-AXIAL SPACES AND MENINGES: There are no abnormal extra-axial fluid collections. Alexandra Shona VASCULAR: The visualized portions of the intracranial carotid and vertebrobasilar systems demonstrate patent appearing flow voids. CALVARIA: Unremarkable. SINUSES: Clear, as visualized. CRANIOCERVICAL JUNCTION: Within normal limits for age. OTHER: None. _______________     IMPRESSION: 1.   Several foci of acute infarction in the right cerebellar hemisphere and cerebellar vermis with an additional focus in the right thalamus and a small focus in the left frontal lobe. 2.  Mild atrophy and chronic small vessel changes with old areas of infarction, as described above. Ct Head Wo Cont    Result Date: 7/25/2019  EXAM: CT head INDICATION: Headache and dizziness for one week. COMPARISON: None. TECHNIQUE: Axial CT imaging of the head was performed without intravenous contrast. One or more dose reduction techniques were used on this CT: automated exposure control, adjustment of the mAs and/or kVp according to patient size, and iterative reconstruction techniques. The specific techniques used on this CT exam have been documented in the patient's electronic medical record. Digital Imaging and Communications in Medicine (DICOM) format image data are available to nonaffiliated external healthcare facilities or entities on a secured, media free, reciprocally searchable basis with patient authorization for at least a 12-month period after this study. _______________ FINDINGS: BRAIN AND POSTERIOR FOSSA: There is no intracranial hemorrhage, mass effect, or midline shift. Acute/subacute appearing focal area of loss of gray-white matter junction is present in the medial posterior aspect of the right cerebellum. EXTRA-AXIAL SPACES AND MENINGES: There are no abnormal extra-axial fluid collections. CALVARIUM: Intact. SINUSES: Clear. OTHER: None. _______________     IMPRESSION: 1. Acute/subacute focal area of loss of the gray-white matter junction is present in the medial posterior aspect of the right cerebellum, concerning for evolving infarct. No acute intracranial hemorrhage. Recommend MRI of the brain for further evaluation. Cta Head Neck W Wo Cont    Result Date: 7/26/2019  EXAM:  CT angiogram of the head and neck with intravenous contrast. CLINICAL INDICATION/HISTORY:Headache and dizziness, diabetes, CVA.  COMPARISON: Correlation CT head and brain MRI 7/25/2019 TECHNIQUE:  Following IV administration of nonionic contrast, helical CTA head and neck performed. Three-dimensional, maximum intensity projection, and curved planar reformations were post-processed at a dedicated outside facility ([employer]). Stenoses are reported with reference to the downstream lumen (\"NASCET\"). One or more dose reduction techniques were used on this CT: automated exposure control, adjustment of the mAs and/or kVp according to patient's size, and iterative reconstruction techniques. The specific techniques utilized on this CT exam have been documented in the patient's electronic medical record. Digital imaging and communications and medicine (DICOM) format image data are available to nonaffiliated external healthcare facilities or entities on a secure, media free, reciprocally searchable basis with patient authorization for at least a 12 month period after this study. _______________ FINDINGS: NECK CTA:      ARTERIAL BOLUS QUALITY:  Satisfactory. AORTIC ARCH: Normal branching pattern. No proximal great vessel stenosis. RIGHT CAROTID ARTERY:  No significant common carotid or internal carotid artery stenosis. LEFT CAROTID ARTERY:  No significant common carotid or internal carotid artery stenosis. VERTEBRAL ARTERIES: The vertebral arteries are codominant. RIGHT VERTEBRAL ARTERY:  No stenosis or other vascular abnormality. LEFT VERTEBRAL ARTERY:  Mild stenosis at the origin with no other significant stenosis or vascular abnormality. LUNG APICES: No abnormal mass with nonspecific patchy groundglass density visualized upper lung zones. NECK SOFT TISSUES: No abnormal mass or adenopathy. Soft tissue air seen adjacent to the right clavicle along the course of the right subclavian vein likely iatrogenic from previous attempted central venous access. OSSEOUS: Degenerative spondylosis, no high-grade canal stenosis.  BRAIN CTA: CAROTID SIPHON AND SUPRACLINOID ICA: No significant stenosis. M1 SEGMENT MCA: No significant stenosis or aneurysm. PROXIMAL M2 SEGMENT MCA: There is focal stenosis proximal inferior division right M2 segment, no other stenosis or evidence of aneurysm. A1 SEGMENT, ANTERIOR COMMUNICATING ARTERY AND PROXIMAL A2 SEGMENTS:           Patent anterior communicating artery with normal variant 3 separate A2 segments, no significant stenosis or aneurysm. VERTEBRAL BASILAR SYSTEM:Patent bilateral posterior communicating arteries with developmentally small right P1 segment. No significant distal vertebral or basilar stenosis. There is short segment filling defect and stenosis involving the right mid PICA. PCA:There is stenosis involving distal right P2 segment without definite filling defect. No other significant stenosis or aneurysm. DISTAL ANTERIOR CEREBRAL ARTERY:No significant stenosis or aneurysm. DISTAL MCA M2/M3 SEGMENT:No significant stenosis or aneurysm. DURAL VENOUS SINUSES: Unremarkable. BRAIN PARENCHYMA ON SOURCE DATA: Multifocal hypodensity right cerebellum compatible with areas of acute infarct is additional small focus of hypodensity right thalamus. No evidence of intracranial mass or mass effect or enhancing lesion. _______________     IMPRESSION: 1. No significant carotid stenosis. Mild stenosis origin left vertebral artery. 2. No evidence of large vessel intracranial occlusion. Initial findings discussed with patient's nurse, Alis Belcher  by Dr. Melany White at 6:49 PM. 3. Short segment filling defect and stenosis right PICA suggestive of likely thromboembolism and partial recanalization. Additional areas of stenosis involving proximal right inferior division M2 segment and right P2 segment posterior cerebral artery. 4. Multifocal infarcts right cerebellum and right thalamus.     ASSESSMENT/IMPRESSION:  Right cerebellar, thalamic and frontal infarcts, suggestive if an embolic source, either cardioembolic or artery to artery emboli. I'm not sure if cocaine has played a role but third the patient statement, he used cocaine after the symptoms started. It is possible that the patient may have had peripheral vertigo and after the use of cocaine, he may have had strokes. There is some atherosclerotic disease in the posterior circulation. RECOMMENDATIONS:  1. Continue aspirin 325 mg daily  2. Atorvastatin 80 mg daily  3. IV fluids or oral liquids for hydration. Normotensive protocol. 4. Echocardiogram is pending  5.  PT/OT and disposition planning    Please do not hesitate to return with any questions.    ------------------------------------  Krupa Llamas MD  7/26/2019  7:59 AM

## 2019-07-26 NOTE — PROGRESS NOTES
Problem: Mobility Impaired (Adult and Pediatric)  Goal: *Acute Goals and Plan of Care (Insert Text)  Description  N/A as pt has met all goals. Outcome: Progressing Towards Goal   PHYSICAL THERAPY EVALUATION & DISCHARGE    Patient: Nato Pizarro (25 y.o. male)  Date: 7/26/2019  Primary Diagnosis: Stroke [facility]) [I63.9]        Precautions: Fall    ASSESSMENT AND RECOMMENDATIONS:  Based on the objective data described below, the patient presents with Mod I-S w/ bed mobility, transfers, gait, and step negotiation. Pt seen in supine prior to session sleeping but easily awaken. Pt reports 4/10 ain in L arm, per pt secondary to an injury 5 years ago. Pt reports no lightheadedness or dizziness at this time. Pt reports PTA that he was I w/ mobility. Pt able to ambulate in the hallway w/o any difficulty however pt demonstrates a wide MAKSIM, lateral weight shift, decrease genesis, and decrease stride length, however pt did not demonstrate any LOB. Pt able to perform step negotiation using HR w/o any difficulty and no LOB noted. Pt transferred back to room where pt was left sitting at the EOB after session, call bell and tray in reach, baldev notified after session. Pt has met all goals at this time, DC from acute PT. Skilled physical therapy is not indicated at this time. Discharge Recommendations: Home Health  Further Equipment Recommendations for Discharge: N/A      SUBJECTIVE:   Patient stated I feel okay, just this arm has been bothering me.     OBJECTIVE DATA SUMMARY:     Past Medical History:   Diagnosis Date    Diabetes ([facility]     Stroke ([facility]    No past surgical history on file.   Barriers to Learning/Limitations: yes;  physical  Compensate with: visual, verbal, tactile, kinesthetic cues/model  Prior Level of Function/Home Situation:   Home Situation  Home Environment: Private residence  # Steps to Enter: 5  Rails to Enter: Yes  Hand Rails : Bilateral  One/Two Story Residence: One story  Living Alone: No  Support Systems: Child(xiao)  Patient Expects to be Discharged to[de-identified] Unknown  Current DME Used/Available at Home: Walker, rolling  Critical Behavior:  Neurologic State: Alert  Orientation Level: Oriented X4  Cognition: Follows commands  Strength:    Strength: Within functional limits  Tone & Sensation:   Tone: Normal  Sensation: Intact  Range Of Motion:  AROM: Within functional limits  Functional Mobility:  Bed Mobility:  Supine to Sit: Modified independent  Sit to Supine: Modified independent  Transfers:  Sit to Stand: Modified independent  Stand to Sit: Modified independent  Balance:   Sitting: Intact  Standing: Intact  Ambulation/Gait Training:  Distance (ft): 350 Feet (ft)  Assistive Device: Gait belt  Ambulation - Level of Assistance: Modified independent;Supervision  Gait Description (WDL): Exceptions to WDL  Gait Abnormalities: Decreased step clearance  Base of Support: Widened    Pain:  Pain Scale 1: Numeric (0 - 10)  Pain Intensity 1: 4  Pain Location 1: Arm  Pain Orientation 1: Left  Activity Tolerance:   Good  Please refer to the flowsheet for vital signs taken during this treatment. After treatment:   ?         Patient left in no apparent distress sitting up in chair  ? Patient left in no apparent distress in bed (sitting at the EOB)  ? Call bell left within reach  ? Nursing notified  ? Caregiver present  ? Bed alarm activated    COMMUNICATION/EDUCATION:   ?         Fall prevention education was provided and the patient/caregiver indicated understanding. ? Patient/family have participated as able in goal setting and plan of care. ?         Patient/family agree to work toward stated goals and plan of care. ?         Patient understands intent and goals of therapy, but is neutral about his/her participation. ? Patient is unable to participate in goal setting and plan of care.     Thank you for this referral.  Monika Jordan, PT   Time Calculation: 16 mins   Eval Complexity: History: MEDIUM  Complexity : 1-2 comorbidities / personal factors will impact the outcome/ POC Exam:LOW Complexity : 1-2 Standardized tests and measures addressing body structure, function, activity limitation and / or participation in recreation  Presentation: LOW Complexity : Stable, uncomplicated  Clinical Decision Making:Low Complexity ambulate >30ft  Overall Complexity:LOW

## 2019-07-26 NOTE — PROGRESS NOTES
OT eval orders received and chart reviewed. Attempt for OT eval x2. Pt getting bed side tests at this time. End of day attempt. To be followed later.      Sabino Esquivel, OTR/L

## 2019-07-26 NOTE — ROUTINE PROCESS
Bedside and Verbal shift change report given to Ji Garcia RN (oncoming nurse) by Moraima Piña RN (offgoing nurse). Report included the following information SBAR and Kardex.

## 2019-07-26 NOTE — PROGRESS NOTES
Hospitalist Progress Note    Patient: Isacc Tim MRN: [id number]  CSN: [id number]    YOB: 1955  Age: 58 y.o. Sex: male    DOA: 7/25/2019 LOS:  LOS: 1 day                Assessment/Plan     Patient Active Problem List   Diagnosis Code    Stroke ([facility] I63.9    Diabetes ([facility] E11.9    Cocaine abuse ([facility] F14.10        57 yo male admitted for dizziness, CVA  Still with dizziness. Acute cerebellar CVA - MRI showed Several foci of acute infarction in the right cerebellar hemisphere and cerebellar vermis with an additional focus in the right thalamus and a small focus in the left frontal lobe. CTA of head and neck with no large vessel stenosis  Echo with normal LVF, EF of 61-65%, no intracardiac shunt. Get fasting lipid panel, HbA1c   Continue with antiplatelet agent     HTN - permissive HTN    DM - on lantus, pre meal insulin, start on diabetic diet and SSI.      Cocaine abuse - counseled. PT/OT       Disposition : 1-2 days    Review of systems  General: No fevers or chills. Cardiovascular: No chest pain or pressure. No palpitations. Pulmonary: No shortness of breath. Gastrointestinal: No nausea, vomiting. Physical Exam:  General: Awake, cooperative, no acute distress    HEENT: NC, Atraumatic. PERRLA, anicteric sclerae. Lungs: CTA Bilaterally. No Wheezing/Rhonchi/Rales. Heart:  S1 S2,  No murmur, No Rubs, No Gallops  Abdomen: Soft, Non distended, Non tender.  +Bowel sounds,   Extremities: No c/c/e  Psych:   Not anxious or agitated. Neurological Exam:   Mental Status:  Alert , co-operative , memory intact . Cranial Nerves:  Intact visual fields. PERRL, EOM's full, no nystagmus, no ptosis. Facial sensation is normal. Facial movement is symmetric. Palate is midline. Normal sternocleidomastoid strength. Tongue is midline. Hearing is intact bilaterally. Motor:  5/5 strength in upper and lower proximal and distal muscles. Normal bulk and tone.     Reflexes:  Deep tendon reflexes 2+/4 and symmetrical.    Sensory:  Normal and symmetric bilaterally. Gait:  Not tested. Cerebellar:  No cerebellar signs present. Vital signs/Intake and Output:  Visit Vitals  /70 (BP 1 Location: Left arm, BP Patient Position: At rest;Lying right side)   Pulse 61   Temp 98.9 °F (37.2 °C)   Resp 18   Ht 5' 9\" (1.753 m)   Wt 86.2 kg (190 lb)   SpO2 99%   BMI 28.06 kg/m²     Current Shift:  No intake/output data recorded. Last three shifts:  07/25 0701 - 07/26 1900  In: 2656.7 [P.O.:540; I.V.:2116.7]  Out: 4688 [Urine:3325]            Labs: Results:       Chemistry Recent Labs     07/26/19  0351 07/25/19  1220   * 103*   * 142   K 4.2 4.4   * 111   CO2 25 27   BUN 27* 28*   CREA 0.98 1.02   CA 8.2* 9.1   AGAP 9 4   BUCR 28* 27*   AP  --  72   TP  --  7.2   ALB  --  3.7   GLOB  --  3.5   AGRAT  --  1.1      CBC w/Diff Recent Labs     07/26/19  0351 07/25/19  1220   WBC 6.2 8.7   RBC 4.99 5.42   HGB 13.7 15.2   HCT 42.5 46.1    213   GRANS  --  60   LYMPH  --  30   EOS  --  2      Cardiac Enzymes Recent Labs     07/25/19  1220   CPK 49   CKND1 CALCULATION NOT PERFORMED WHEN RESULT IS BELOW LINEAR LIMIT      Coagulation No results for input(s): PTP, INR, APTT in the last 72 hours. No lab exists for component: INREXT    Lipid Panel Lab Results   Component Value Date/Time    Cholesterol, total 155 07/25/2019 12:20 PM    HDL Cholesterol 56 07/25/2019 12:20 PM    LDL, calculated 76 07/25/2019 12:20 PM    VLDL, calculated 23 07/25/2019 12:20 PM    Triglyceride 115 07/25/2019 12:20 PM    CHOL/HDL Ratio 2.8 07/25/2019 12:20 PM      BNP No results for input(s): BNPP in the last 72 hours.    Liver Enzymes Recent Labs     07/25/19  1220   TP 7.2   ALB 3.7   AP 72   SGOT 13      Thyroid Studies No results found for: T4, T3U, TSH, TSHEXT     Procedures/imaging: see electronic medical records for all procedures/Xrays and details which were not copied into this note but were reviewed prior to creation of Plan

## 2019-07-27 ENCOUNTER — HOME HEALTH ADMISSION (OUTPATIENT)
Dept: HOME HEALTH SERVICES | Facility: HOME HEALTH | Age: 64
End: 2019-07-27
Payer: COMMERCIAL

## 2019-07-27 VITALS
SYSTOLIC BLOOD PRESSURE: 136 MMHG | WEIGHT: 188.3 LBS | HEIGHT: 69 IN | RESPIRATION RATE: 18 BRPM | HEART RATE: 62 BPM | DIASTOLIC BLOOD PRESSURE: 76 MMHG | OXYGEN SATURATION: 98 % | BODY MASS INDEX: 27.89 KG/M2 | TEMPERATURE: 98.2 F

## 2019-07-27 PROBLEM — I10 HTN (HYPERTENSION): Status: ACTIVE | Noted: 2019-07-27

## 2019-07-27 LAB
ANION GAP SERPL CALC-SCNC: 9 MMOL/L (ref 3–18)
BUN SERPL-MCNC: 22 MG/DL (ref 7–18)
BUN/CREAT SERPL: 23 (ref 12–20)
CALCIUM SERPL-MCNC: 8.3 MG/DL (ref 8.5–10.1)
CHLORIDE SERPL-SCNC: 111 MMOL/L (ref 100–111)
CO2 SERPL-SCNC: 24 MMOL/L (ref 21–32)
CREAT SERPL-MCNC: 0.96 MG/DL (ref 0.6–1.3)
ERYTHROCYTE [DISTWIDTH] IN BLOOD BY AUTOMATED COUNT: 14.7 % (ref 11.6–14.5)
GLUCOSE BLD STRIP.AUTO-MCNC: 151 MG/DL (ref 70–110)
GLUCOSE BLD STRIP.AUTO-MCNC: 163 MG/DL (ref 70–110)
GLUCOSE BLD STRIP.AUTO-MCNC: 273 MG/DL (ref 70–110)
GLUCOSE SERPL-MCNC: 128 MG/DL (ref 74–99)
HCT VFR BLD AUTO: 44.7 % (ref 36–48)
HGB BLD-MCNC: 14.1 G/DL (ref 13–16)
MCH RBC QN AUTO: 27.1 PG (ref 24–34)
MCHC RBC AUTO-ENTMCNC: 31.5 G/DL (ref 31–37)
MCV RBC AUTO: 85.8 FL (ref 74–97)
PLATELET # BLD AUTO: 181 K/UL (ref 135–420)
PMV BLD AUTO: 11.5 FL (ref 9.2–11.8)
POTASSIUM SERPL-SCNC: 4 MMOL/L (ref 3.5–5.5)
RBC # BLD AUTO: 5.21 M/UL (ref 4.7–5.5)
SODIUM SERPL-SCNC: 144 MMOL/L (ref 136–145)
WBC # BLD AUTO: 7.9 K/UL (ref 4.6–13.2)

## 2019-07-27 PROCEDURE — 74011250637 HC RX REV CODE- 250/637: Performed by: HOSPITALIST

## 2019-07-27 PROCEDURE — 74011636637 HC RX REV CODE- 636/637: Performed by: HOSPITALIST

## 2019-07-27 PROCEDURE — 82962 GLUCOSE BLOOD TEST: CPT

## 2019-07-27 PROCEDURE — 74011250637 HC RX REV CODE- 250/637: Performed by: PSYCHIATRY & NEUROLOGY

## 2019-07-27 PROCEDURE — 36415 COLL VENOUS BLD VENIPUNCTURE: CPT

## 2019-07-27 PROCEDURE — 85027 COMPLETE CBC AUTOMATED: CPT

## 2019-07-27 PROCEDURE — 80048 BASIC METABOLIC PNL TOTAL CA: CPT

## 2019-07-27 PROCEDURE — 74011250637 HC RX REV CODE- 250/637: Performed by: FAMILY MEDICINE

## 2019-07-27 RX ORDER — TRAZODONE HYDROCHLORIDE 100 MG/1
100 TABLET ORAL
Status: DISCONTINUED | OUTPATIENT
Start: 2019-07-27 | End: 2019-07-27 | Stop reason: HOSPADM

## 2019-07-27 RX ORDER — ASPIRIN 325 MG
325 TABLET, DELAYED RELEASE (ENTERIC COATED) ORAL DAILY
Qty: 30 TAB | Refills: 0 | Status: SHIPPED | OUTPATIENT
Start: 2019-07-28

## 2019-07-27 RX ORDER — AMLODIPINE BESYLATE 5 MG/1
5 TABLET ORAL DAILY
Status: DISCONTINUED | OUTPATIENT
Start: 2019-07-27 | End: 2019-07-27 | Stop reason: HOSPADM

## 2019-07-27 RX ORDER — ATORVASTATIN CALCIUM 80 MG/1
80 TABLET, FILM COATED ORAL DAILY
Qty: 30 TAB | Refills: 0 | Status: SHIPPED | OUTPATIENT
Start: 2019-07-28

## 2019-07-27 RX ORDER — AMLODIPINE BESYLATE 5 MG/1
5 TABLET ORAL DAILY
Qty: 30 TAB | Refills: 0 | Status: SHIPPED | OUTPATIENT
Start: 2019-07-27

## 2019-07-27 RX ADMIN — INSULIN LISPRO 6 UNITS: 100 INJECTION, SOLUTION INTRAVENOUS; SUBCUTANEOUS at 12:34

## 2019-07-27 RX ADMIN — TRAZODONE HYDROCHLORIDE 100 MG: 100 TABLET ORAL at 03:28

## 2019-07-27 RX ADMIN — ASPIRIN 325 MG: 325 TABLET, DELAYED RELEASE ORAL at 10:18

## 2019-07-27 RX ADMIN — ATORVASTATIN CALCIUM 80 MG: 20 TABLET, FILM COATED ORAL at 10:18

## 2019-07-27 RX ADMIN — INSULIN LISPRO 5 UNITS: 100 INJECTION, SOLUTION INTRAVENOUS; SUBCUTANEOUS at 12:35

## 2019-07-27 RX ADMIN — INSULIN LISPRO 3 UNITS: 100 INJECTION, SOLUTION INTRAVENOUS; SUBCUTANEOUS at 06:37

## 2019-07-27 NOTE — ROUTINE PROCESS
Bedside and Verbal shift change report given to Марина Vickers RN (oncoming nurse) by Cecille Ronquillo RN (offgoing nurse). Report included the following information SBAR and Kardex.

## 2019-07-27 NOTE — PROGRESS NOTES
Bedside shift change report given to Khris Amor (oncoming nurse) by Reginald Bai (offgoing nurse). Report included the following information SBAR, Kardex, ED Summary, MAR, Accordion, Recent Results, Med Rec Status, Cardiac Rhythm NSR and Quality Measures. Patient denies any pain or distress.

## 2019-07-27 NOTE — DISCHARGE SUMMARY
Discharge Summary    Patient: Yasmeen Otero MRN: [id number]  CSN: [id number]    YOB: 1955  Age: 58 y.o. Sex: male    DOA: 7/25/2019 LOS:  LOS: 2 days   Discharge Date:      Primary Care Provider:  Ziyad Sesay MD    Admission Diagnoses: Stroke [facility]) [I63.9]    Discharge Diagnoses:    Problem List as of 7/27/2019 Never Reviewed          Codes Class Noted - Resolved    HTN (hypertension) ICD-10-CM: I10  ICD-9-CM: 401.9  7/27/2019 - Present        Stroke ([facility] ICD-10-CM: I63.9  ICD-9-CM: 434.91  7/25/2019 - Present        Diabetes ([facility] ICD-10-CM: E11.9  ICD-9-CM: 250.00  Unknown - Present        Cocaine abuse ([facility] ICD-10-CM: F14.10  ICD-9-CM: 305.60  7/25/2019 - Present              Discharge Medications:     Current Discharge Medication List      START taking these medications    Details   amLODIPine (NORVASC) 5 mg tablet Take 1 Tab by mouth daily. Qty: 30 Tab, Refills: 0      aspirin delayed-release 325 mg tablet Take 1 Tab by mouth daily. Qty: 30 Tab, Refills: 0      atorvastatin (LIPITOR) 80 mg tablet Take 1 Tab by mouth daily. Qty: 30 Tab, Refills: 0         CONTINUE these medications which have NOT CHANGED    Details   insulin lispro (HUMALOG KWIKPEN INSULIN) 100 unit/mL kwikpen inject 5 units subcutaneously before meals      insulin detemir U-100 (LEVEMIR FLEXTOUCH U-100 INSULN) 100 unit/mL (3 mL) inpn inject 10 units subcutaneously every evening             Discharge Condition: Good        Consults: Neurology      PHYSICAL EXAM   Visit Vitals  /76 (BP 1 Location: Right arm, BP Patient Position: At rest;Supine)   Pulse 62   Temp 98.2 °F (36.8 °C)   Resp 18   Ht 5' 9\" (1.753 m)   Wt 85.4 kg (188 lb 4.8 oz)   SpO2 98%   BMI 27.81 kg/m²     General: Awake, cooperative, no acute distress    HEENT: NC, Atraumatic. PERRLA, EOMI. Anicteric sclerae. Lungs:  CTA Bilaterally. No Wheezing/Rhonchi/Rales.   Heart:  Regular  rhythm,  No murmur, No Rubs, No Gallops  Abdomen: Soft, Non distended, Non tender. +Bowel sounds,   Extremities: No c/c/e  Psych:   Not anxious or agitated. Neurologic:  No acute neurological deficits. Admission HPI :   Yessenia Brennan is a 58 y.o. male who has past history of DM, presents to ER with concerns of dizziness and vertigo along with gait problem. Symptoms started about a week ago. He woke up disoriented and confused, he had fall and had to crawl to bathroom, where he vomited 3 times. Other symptoms include difficulty walking, nausea and headache. He reports that he used crack cocaine for symptoms but did not help. He denies any fever/chills, chest pain, SOB. Denies unilateral weakness or numbness. Smokes 2ppd. In ER CT showed acute/subacute CVA involving cerebellar area. Hospital Course :   Mr. Carolina Saldivar, was admitted to telemetry, he was seen and followed by neurology. He did not had any acute events during hospitalization. Acute CVA - involving right cerebellar hemisphere, right thalamus and small focus on left frontal lobe. MRI showed Several foci of acute infarction in the right cerebellar hemisphere and cerebellar vermis with an additional focus in the right thalamus and a small focus in the left frontal lobe. CTA of head and neck with no large vessel stenosis  Echo with normal LVF, EF of 61-65%, no intracardiac shunt. fasting lipid panel in normal range, HbA1c 7.0  Started on antiplatelet agent  aspirin and also on statin. Neurology recommended discharging on aspirin and statin.      HTN - initially maintained permissive HTN, later started treating BP, started on low dose norvasc    DM -  started on lantus, pre meal insulin, diabetic diet and SSI. Blood sugars controlled. Cocaine use - counseled. He worked with PT/OT and home health PT/OT recommended.         Activity: Activity as tolerated    Diet: Diabetic Diet    Follow-up: PCP, neurology    Disposition: home with home health    Minutes spent on discharge: 45       Labs: Results:       Chemistry Recent Labs     07/27/19  0245 07/26/19  0351 07/25/19  1220   * 208* 103*    146* 142   K 4.0 4.2 4.4    112* 111   CO2 24 25 27   BUN 22* 27* 28*   CREA 0.96 0.98 1.02   CA 8.3* 8.2* 9.1   AGAP 9 9 4   BUCR 23* 28* 27*   AP  --   --  72   TP  --   --  7.2   ALB  --   --  3.7   GLOB  --   --  3.5   AGRAT  --   --  1.1      CBC w/Diff Recent Labs     07/27/19  0245 07/26/19  0351 07/25/19  1220   WBC 7.9 6.2 8.7   RBC 5.21 4.99 5.42   HGB 14.1 13.7 15.2   HCT 44.7 42.5 46.1    173 213   GRANS  --   --  60   LYMPH  --   --  30   EOS  --   --  2      Cardiac Enzymes Recent Labs     07/25/19  1220   CPK 49   CKND1 CALCULATION NOT PERFORMED WHEN RESULT IS BELOW LINEAR LIMIT      Coagulation No results for input(s): PTP, INR, APTT in the last 72 hours. No lab exists for component: INREXT, INREXT    Lipid Panel Lab Results   Component Value Date/Time    Cholesterol, total 155 07/25/2019 12:20 PM    HDL Cholesterol 56 07/25/2019 12:20 PM    LDL, calculated 76 07/25/2019 12:20 PM    VLDL, calculated 23 07/25/2019 12:20 PM    Triglyceride 115 07/25/2019 12:20 PM    CHOL/HDL Ratio 2.8 07/25/2019 12:20 PM      BNP No results for input(s): BNPP in the last 72 hours. Liver Enzymes Recent Labs     07/25/19  1220   TP 7.2   ALB 3.7   AP 72   SGOT 13      Thyroid Studies No results found for: T4, T3U, TSH, TSHEXT, TSHEXT         Significant Diagnostic Studies: Mri Brain Wo Cont    Result Date: 7/25/2019  EXAM: MRI brain without contrast 7/25/2019. CLINICAL INDICATION/HISTORY: Dizziness. Falling to the left for approximately 1 week. COMPARISON: CT scan of the head from 7/25/2019. TECHNIQUE: Multiplanar multisequential images of the brain were obtained without contrast. _______________ FINDINGS: BRAIN AND POSTERIOR FOSSA: Scattered foci of acute infarction are noted involving the inferior aspect of the right cerebellar hemisphere.   Additional small foci are noted within the cerebellar vermis and along the posterior medial aspect of the right mid to upper cerebellum. There is also a small focus of acute infarct in the posterolateral aspect of the right thalamus. There is also a 6 mm elliptical area of high signal on the diffusion-weighted images in the high left frontal centrum semiovale, involving the medial subcortical white matter of the left frontal lobe (image 22). This area is also low in signal on the [facility] maps suggesting an additional focus of acute infarction. The disparate vascular distributions consistent with an embolic etiology. Mild atrophy and chronic small vessel changes are present. There is a probable small focus of old lacunar infarction in the central aspect of the right side of the trevor. An old focus of infarct is also present along the right thalamocapsular interface. There is no intracranial hemorrhage, mass effect, or midline shift. There are no significant additional areas of abnormal parenchymal signal. No additional foci of true restricted diffusion are identified. EXTRA-AXIAL SPACES AND MENINGES: There are no abnormal extra-axial fluid collections. Joan Abbot VASCULAR: The visualized portions of the intracranial carotid and vertebrobasilar systems demonstrate patent appearing flow voids. CALVARIA: Unremarkable. SINUSES: Clear, as visualized. CRANIOCERVICAL JUNCTION: Within normal limits for age. OTHER: None. _______________     IMPRESSION: 1. Several foci of acute infarction in the right cerebellar hemisphere and cerebellar vermis with an additional focus in the right thalamus and a small focus in the left frontal lobe. 2.  Mild atrophy and chronic small vessel changes with old areas of infarction, as described above. Ct Head Wo Cont    Result Date: 7/25/2019  EXAM: CT head INDICATION: Headache and dizziness for one week. COMPARISON: None.  TECHNIQUE: Axial CT imaging of the head was performed without intravenous contrast. One or more dose reduction techniques were used on this CT: automated exposure control, adjustment of the mAs and/or kVp according to patient size, and iterative reconstruction techniques. The specific techniques used on this CT exam have been documented in the patient's electronic medical record. Digital Imaging and Communications in Medicine (DICOM) format image data are available to nonaffiliated external healthcare facilities or entities on a secured, media free, reciprocally searchable basis with patient authorization for at least a 12-month period after this study. _______________ FINDINGS: BRAIN AND POSTERIOR FOSSA: There is no intracranial hemorrhage, mass effect, or midline shift. Acute/subacute appearing focal area of loss of gray-white matter junction is present in the medial posterior aspect of the right cerebellum. EXTRA-AXIAL SPACES AND MENINGES: There are no abnormal extra-axial fluid collections. CALVARIUM: Intact. SINUSES: Clear. OTHER: None. _______________     IMPRESSION: 1. Acute/subacute focal area of loss of the gray-white matter junction is present in the medial posterior aspect of the right cerebellum, concerning for evolving infarct. No acute intracranial hemorrhage. Recommend MRI of the brain for further evaluation. Cta Head Neck W Wo Cont    Result Date: 7/26/2019  EXAM:  CT angiogram of the head and neck with intravenous contrast. CLINICAL INDICATION/HISTORY:Headache and dizziness, diabetes, CVA. COMPARISON: Correlation CT head and brain MRI 7/25/2019 TECHNIQUE:  Following IV administration of nonionic contrast, helical CTA head and neck performed. Three-dimensional, maximum intensity projection, and curved planar reformations were post-processed at a dedicated outside facility ([employer]). Stenoses are reported with reference to the downstream lumen (\"NASCET\").   One or more dose reduction techniques were used on this CT: automated exposure control, adjustment of the mAs and/or kVp according to patient's size, and iterative reconstruction techniques. The specific techniques utilized on this CT exam have been documented in the patient's electronic medical record. Digital imaging and communications and medicine (DICOM) format image data are available to nonaffiliated external healthcare facilities or entities on a secure, media free, reciprocally searchable basis with patient authorization for at least a 12 month period after this study. _______________ FINDINGS: NECK CTA:      ARTERIAL BOLUS QUALITY:  Satisfactory. AORTIC ARCH: Normal branching pattern. No proximal great vessel stenosis. RIGHT CAROTID ARTERY:  No significant common carotid or internal carotid artery stenosis. LEFT CAROTID ARTERY:  No significant common carotid or internal carotid artery stenosis. VERTEBRAL ARTERIES: The vertebral arteries are codominant. RIGHT VERTEBRAL ARTERY:  No stenosis or other vascular abnormality. LEFT VERTEBRAL ARTERY:  Mild stenosis at the origin with no other significant stenosis or vascular abnormality. LUNG APICES: No abnormal mass with nonspecific patchy groundglass density visualized upper lung zones. NECK SOFT TISSUES: No abnormal mass or adenopathy. Soft tissue air seen adjacent to the right clavicle along the course of the right subclavian vein likely iatrogenic from previous attempted central venous access. OSSEOUS: Degenerative spondylosis, no high-grade canal stenosis. BRAIN CTA:      CAROTID SIPHON AND SUPRACLINOID ICA: No significant stenosis. M1 SEGMENT MCA: No significant stenosis or aneurysm. PROXIMAL M2 SEGMENT MCA: There is focal stenosis proximal inferior division right M2 segment, no other stenosis or evidence of aneurysm.       A1 SEGMENT, ANTERIOR COMMUNICATING ARTERY AND PROXIMAL A2 SEGMENTS:           Patent anterior communicating artery with normal variant 3 separate A2 segments, no significant stenosis or aneurysm. VERTEBRAL BASILAR SYSTEM:Patent bilateral posterior communicating arteries with developmentally small right P1 segment. No significant distal vertebral or basilar stenosis. There is short segment filling defect and stenosis involving the right mid PICA. PCA:There is stenosis involving distal right P2 segment without definite filling defect. No other significant stenosis or aneurysm. DISTAL ANTERIOR CEREBRAL ARTERY:No significant stenosis or aneurysm. DISTAL MCA M2/M3 SEGMENT:No significant stenosis or aneurysm. DURAL VENOUS SINUSES: Unremarkable. BRAIN PARENCHYMA ON SOURCE DATA: Multifocal hypodensity right cerebellum compatible with areas of acute infarct is additional small focus of hypodensity right thalamus. No evidence of intracranial mass or mass effect or enhancing lesion. _______________     IMPRESSION: 1. No significant carotid stenosis. Mild stenosis origin left vertebral artery. 2. No evidence of large vessel intracranial occlusion. Initial findings discussed with patient's nurse, Ajay Campos  by Dr. Godfrey Sanchez at 6:49 PM. 3. Short segment filling defect and stenosis right PICA suggestive of likely thromboembolism and partial recanalization. Additional areas of stenosis involving proximal right inferior division M2 segment and right P2 segment posterior cerebral artery. 4. Multifocal infarcts right cerebellum and right thalamus. No results found for this or any previous visit.         CC: Camila Calderón MD

## 2019-07-27 NOTE — PROGRESS NOTES
0325- Pt anxious, agitated and appears clammy. Reported he takes Trazodone every night. Dr Madina Ricks made aware and new orders received.

## 2019-07-27 NOTE — PROGRESS NOTES
DC Plan: Discharge home today with MD follow up, family assistance and [facility]    Chart reviewed as CM on call. Pt to dc home today. Met with pt at bedside, no friend, family present. Pt states he lives alone, but son/daughter live next door. PT recommending HH. FOC offered. Pt chose [facility]. Referral placed. Pt states he plans to drive home, if okay with MD.  Pts home address confirmed per face sheet. Pt denies using DME. No dc concerns identified. CM will cont to follow for any discharge planning needs. Care Management Interventions  PCP Verified by CM:  Yes  Mode of Transport at Discharge: Self  Transition of Care Consult (CM Consult): Discharge Planning, [address]: Yes  Current Support Network: Lives Alone, Family Lives Nearby  Confirm Follow Up Transport: Self  Plan discussed with Pt/Family/Caregiver: Yes  Discharge Location  Discharge Placement: Home with home health

## 2019-07-27 NOTE — PROGRESS NOTES
Problem: Falls - Risk of  Goal: *Absence of Falls  Description  Document Belem Be Fall Risk and appropriate interventions in the flowsheet.   Outcome: Progressing Towards Goal  Note:   Fall Risk Interventions:            Medication Interventions: Teach patient to arise slowly

## 2019-07-27 NOTE — PROGRESS NOTES
Problem: Diabetes Self-Management  Goal: *Disease process and treatment process  Description  Define diabetes and identify own type of diabetes; list 3 options for treating diabetes. Outcome: Progressing Towards Goal  Goal: *Incorporating nutritional management into lifestyle  Description  Describe effect of type, amount and timing of food on blood glucose; list 3 methods for planning meals. Outcome: Progressing Towards Goal  Goal: *Incorporating physical activity into lifestyle  Description  State effect of exercise on blood glucose levels. Outcome: Progressing Towards Goal  Goal: *Developing strategies to promote health/change behavior  Description  Define the ABC's of diabetes; identify appropriate screenings, schedule and personal plan for screenings. Outcome: Progressing Towards Goal  Goal: *Using medications safely  Description  State effect of diabetes medications on diabetes; name diabetes medication taking, action and side effects. Outcome: Progressing Towards Goal  Goal: *Monitoring blood glucose, interpreting and using results  Description  Identify recommended blood glucose targets  and personal targets. Outcome: Progressing Towards Goal  Goal: *Prevention, detection, treatment of acute complications  Description  List symptoms of hyper- and hypoglycemia; describe how to treat low blood sugar and actions for lowering  high blood glucose level. Outcome: Progressing Towards Goal  Goal: *Prevention, detection and treatment of chronic complications  Description  Define the natural course of diabetes and describe the relationship of blood glucose levels to long term complications of diabetes.   Outcome: Progressing Towards Goal  Goal: *Developing strategies to address psychosocial issues  Description  Describe feelings about living with diabetes; identify support needed and support network  Outcome: Progressing Towards Goal  Goal: *Insulin pump training  Outcome: Progressing Towards Goal  Goal: *Sick day guidelines  Outcome: Progressing Towards Goal  Goal: *Patient Specific Goal (EDIT GOAL, INSERT TEXT)  Outcome: Progressing Towards Goal     Problem: Patient Education: Go to Patient Education Activity  Goal: Patient/Family Education  Outcome: Progressing Towards Goal     Problem: Falls - Risk of  Goal: *Absence of Falls  Description  Document Amna Boggs Fall Risk and appropriate interventions in the flowsheet.   Outcome: Progressing Towards Goal  Note:   Fall Risk Interventions:            Medication Interventions: Teach patient to arise slowly                   Problem: Patient Education: Go to Patient Education Activity  Goal: Patient/Family Education  Outcome: Progressing Towards Goal     Problem: Patient Education: Go to Patient Education Activity  Goal: Patient/Family Education  Outcome: Progressing Towards Goal     Problem: TIA/CVA Stroke: Day 2 Until Discharge  Goal: Off Pathway (Use only if patient is Off Pathway)  Outcome: Progressing Towards Goal  Goal: Activity/Safety  Outcome: Progressing Towards Goal  Goal: Diagnostic Test/Procedures  Outcome: Progressing Towards Goal  Goal: Nutrition/Diet  Outcome: Progressing Towards Goal  Goal: Discharge Planning  Outcome: Progressing Towards Goal  Goal: Medications  Outcome: Progressing Towards Goal  Goal: Respiratory  Outcome: Progressing Towards Goal  Goal: Treatments/Interventions/Procedures  Outcome: Progressing Towards Goal  Goal: Psychosocial  Outcome: Progressing Towards Goal  Goal: *Verbalizes anxiety and depression are reduced or absent  Outcome: Progressing Towards Goal  Goal: *Absence of aspiration  Outcome: Progressing Towards Goal  Goal: *Absence of deep venous thrombosis signs and symptoms(Stroke Metric)  Outcome: Progressing Towards Goal  Goal: *Optimal pain control at patient's stated goal  Outcome: Progressing Towards Goal  Goal: *Tolerating diet  Outcome: Progressing Towards Goal  Goal: *Ability to perform ADLs and demonstrates progressive mobility and function  Outcome: Progressing Towards Goal  Goal: *Stroke education continued(Stroke Metric)  Outcome: Progressing Towards Goal

## 2019-07-27 NOTE — PROGRESS NOTES
NEUROLOGY PROGRESS NOTE        Patient: Wendy Osborn        Sex: male          DOA: 7/25/2019  YOB: 1955      Age:  58 y.o.         LOS: 2 days     Identification:  [id number] gentleman with history of diabetes, prior CVA with speech problems, depression and heavy tobacco use, who presented with left cerebellar and cortical infarcts. SUBJECTIVE:   Chart reviewed. Stroke Work-up:  Brain MRI: 1. Several foci of acute infarction in the right cerebellar hemisphere and cerebellar vermis with an additional focus in the right thalamus and a small focus in the left frontal lobe. 2.  Mild atrophy and chronic small vessel changes with old areas of infarction, as described above. CTA of head and neck: 1. No significant carotid stenosis. Mild stenosis origin left vertebral artery. 2. No evidence of large vessel intracranial occlusion. 3. Short segment filling defect and stenosis right PICA suggestive of likely thromboembolism and partial recanalization. Additional areas of stenosis involving proximal right inferior division M2 segment and right P2 segment posterior cerebral artery. 4. Multifocal infarcts right cerebellum and right thalamus. Echocardiogram:   · Left Ventricle: Normal cavity size and systolic function (ejection fraction normal). Mild concentric hypertrophy. The estimated ejection fraction is 61 - 65%. There is mild (grade 1) left ventricular diastolic dysfunction E/E' ratio is 10.30. · Left Atrium: Mildly dilated left atrium. · Interatrial Septum: Agitated saline contrast study was performed. There was no shunting at baseline or with Valsalva. No atrial septal defect present. · Tricuspid Valve: Normal valve structure and no stenosis. Trace tricuspid valve regurgitation. Pulmonary arterial systolic pressure is 47.5 mmHg. There is no evidence of pulmonary hypertension.   Lipid panel:         Lab Results   Component Value Date/Time     Cholesterol, total 155 07/25/2019 12:20 PM     HDL Cholesterol 56 07/25/2019 12:20 PM     LDL, calculated 76 07/25/2019 12:20 PM     VLDL, calculated 23 07/25/2019 12:20 PM     Triglyceride 115 07/25/2019 12:20 PM     CHOL/HDL Ratio 2.8 07/25/2019 12:20 PM      HbA1c:         Lab Results   Component Value Date/Time     Hemoglobin A1c 7.0 (H) 07/25/2019 12:20 PM      ASSESSMENT/IMPRESSION:  Right cerebellar, thalamic and frontal infarcts, suggestive of an embolic source, either cardioembolic or artery to artery emboli. There is high possibility that cocaine has played a role.     RECOMMENDATIONS:  1. Continue aspirin 325 mg daily  2. Atorvastatin 80 mg daily  3. Oral liquids for hydration. Normotensive protocol. 4. OK to d/c home from neuro perspective  5. PT/OT and disposition planning. Neurology signs off. I will follow the patient as outpatient.     Please do not hesitate to return with any questions.       Signed:  Oliver Bejarano MD  7/27/2019  9:04 AM    ============    OBJECTIVE:      Visit Vitals  /70 (BP 1 Location: Right arm, BP Patient Position: At rest)   Pulse (!) 59   Temp 97.9 °F (36.6 °C)   Resp 18   Ht 5' 9\" (1.753 m)   Wt 85.4 kg (188 lb 4.8 oz)   SpO2 100%   BMI 27.81 kg/m²       Current Facility-Administered Medications   Medication Dose Route Frequency    traZODone (DESYREL) tablet 100 mg  100 mg Oral QHS    acetaminophen (TYLENOL) tablet 650 mg  650 mg Oral Q6H PRN    aspirin delayed-release tablet 325 mg  325 mg Oral DAILY    insulin glargine (LANTUS) injection 10 Units  10 Units SubCUTAneous QPM    insulin lispro (HUMALOG) injection 5 Units  5 Units SubCUTAneous TIDAC    glucose chewable tablet 16 g  16 g Oral PRN    glucagon (GLUCAGEN) injection 1 mg  1 mg IntraMUSCular PRN    insulin lispro (HUMALOG) injection   SubCUTAneous AC&HS    atorvastatin (LIPITOR) tablet 80 mg  80 mg Oral DAILY    0.9% sodium chloride infusion  100 mL/hr IntraVENous CONTINUOUS    enoxaparin (LOVENOX) injection 40 mg  40 mg SubCUTAneous Q24H    nicotine (NICODERM CQ) 14 mg/24 hr patch 1 Patch  1 Patch TransDERmal DAILY       Laboratory  Recent Results (from the past 24 hour(s))   GLUCOSE, POC    Collection Time: 07/26/19 11:46 AM   Result Value Ref Range    Glucose (POC) 193 (H) 70 - 110 mg/dL   ECHO ADULT COMPLETE    Collection Time: 07/26/19  1:36 PM   Result Value Ref Range    LA Volume 75.47 18 - 58 mL    Right Atrial Area 4C 12.12 cm2    Ao Root D 3.49 cm    Aortic Valve Systolic Peak Velocity 335.59 cm/s    AoV VTI 32.64 cm    Aortic Valve Area by Continuity of Peak Velocity 3.0 cm2    Aortic Valve Area by Continuity of VTI 3.4 cm2    AoV PG 10.7 mmHg    LVIDd 5.09 4.2 - 5.9 cm    LVPWd 1.16 (A) 0.6 - 1.0 cm    LVIDs 2.93 cm    IVSd 1.17 (A) 0.6 - 1.0 cm    LV ED Vol A2C 67.6 mL    LV ES Vol A4C 35.6 mL    LV ES Vol BP 32.8 22 - 58 mL    LVOT d 2.11 cm    LVOT Peak Velocity 140.70 cm/s    LVOT Peak Gradient 7.9 mmHg    LVOT VTI 31.88 cm    LV E' Septal Velocity 10.00 cm/s    LV E' Lateral Velocity 11.00 cm/s    MVA (PHT) 4.7 cm2    MV A Desmond 117.60 cm/s    MV E Desmond 107.95 cm/s    MV E/A 0.92     RVIDd 3.72 cm    Aortic Valve Systolic Mean Gradient 5.5 mmHg    BP EF 60.8 55 - 100 %    LV Ejection Fraction MOD 4C 59 %    LV Ejection Fraction MOD 2C 63 %    LA Vol 4C 53.48 18 - 58 mL    LA Vol 2C 83.72 (A) 18 - 58 mL    LV Mass .5 (A) 88 - 224 g    LV Mass AL Index 136.3 49 - 115 g/m2    E/E' lateral 9.81     E/E' septal 10.80     TAPSV 2.9 cm/s    E/E' ratio (averaged) 10.30     LV ES Vol A2C 25.0 mL    LVES Vol Index BP 16.2 mL/m2    LV ED Vol A4C 86.6 mL    LVED Vol Index BP 41.4 mL/m2    Mitral Valve E Wave Deceleration Time 162.0 ms    Mitral Valve Pressure Half-time 47.0 ms    Left Atrium Major Axis 4.24 cm    Triscuspid Valve Regurgitation Peak Gradient 20.4 mmHg    Pulmonic Valve Max Velocity 103.77 cm/s    LV ED Vol BP 83.6 67 - 155 ml    TR Max Velocity 225.77 cm/s    LA Vol Index 37.34 16 - 28 ml/m2    LA Vol Index 41.42 16 - 28 ml/m2 LA Vol Index 26.46 16 - 28 ml/m2    LVED Vol Index A4C 42.8 mL/m2    LVED Vol Index A2C 33.4 mL/m2    LVES Vol Index A4C 17.6 mL/m2    LVES Vol Index A2C 12.4 mL/m2    ZAID/BSA Pk Desmond 1.5 cm2/m2    ZAID/BSA VTI 1.7 cm2/m2    PV peak gradient 4.3 mmHg    IVC proximal 2.07 cm    PASP 25.0 mmHg   GLUCOSE, POC    Collection Time: 07/26/19  4:53 PM   Result Value Ref Range    Glucose (POC) 127 (H) 70 - 110 mg/dL   GLUCOSE, POC    Collection Time: 07/26/19  9:24 PM   Result Value Ref Range    Glucose (POC) 211 (H) 70 - 678 mg/dL   METABOLIC PANEL, BASIC    Collection Time: 07/27/19  2:45 AM   Result Value Ref Range    Sodium 144 136 - 145 mmol/L    Potassium 4.0 3.5 - 5.5 mmol/L    Chloride 111 100 - 111 mmol/L    CO2 24 21 - 32 mmol/L    Anion gap 9 3.0 - 18 mmol/L    Glucose 128 (H) 74 - 99 mg/dL    BUN 22 (H) 7.0 - 18 MG/DL    Creatinine 0.96 0.6 - 1.3 MG/DL    BUN/Creatinine ratio 23 (H) 12 - 20      GFR est AA >60 >60 ml/min/1.73m2    GFR est non-AA >60 >60 ml/min/1.73m2    Calcium 8.3 (L) 8.5 - 10.1 MG/DL   CBC W/O DIFF    Collection Time: 07/27/19  2:45 AM   Result Value Ref Range    WBC 7.9 4.6 - 13.2 K/uL    RBC 5.21 4.70 - 5.50 M/uL    HGB 14.1 13.0 - 16.0 g/dL    HCT 44.7 36.0 - 48.0 %    MCV 85.8 74.0 - 97.0 FL    MCH 27.1 24.0 - 34.0 PG    MCHC 31.5 31.0 - 37.0 g/dL    RDW 14.7 (H) 11.6 - 14.5 %    PLATELET 115 339 - 446 K/uL    MPV 11.5 9.2 - 11.8 FL   GLUCOSE, POC    Collection Time: 07/27/19  3:32 AM   Result Value Ref Range    Glucose (POC) 151 (H) 70 - 110 mg/dL   GLUCOSE, POC    Collection Time: 07/27/19  6:02 AM   Result Value Ref Range    Glucose (POC) 163 (H) 70 - 110 mg/dL       Radiology:  Mri Brain Wo Cont    Result Date: 7/25/2019  EXAM: MRI brain without contrast 7/25/2019. CLINICAL INDICATION/HISTORY: Dizziness. Falling to the left for approximately 1 week. COMPARISON: CT scan of the head from 7/25/2019.  TECHNIQUE: Multiplanar multisequential images of the brain were obtained without contrast. _______________ FINDINGS: BRAIN AND POSTERIOR FOSSA: Scattered foci of acute infarction are noted involving the inferior aspect of the right cerebellar hemisphere. Additional small foci are noted within the cerebellar vermis and along the posterior medial aspect of the right mid to upper cerebellum. There is also a small focus of acute infarct in the posterolateral aspect of the right thalamus. There is also a 6 mm elliptical area of high signal on the diffusion-weighted images in the high left frontal centrum semiovale, involving the medial subcortical white matter of the left frontal lobe (image 22). This area is also low in signal on the [facility] maps suggesting an additional focus of acute infarction. The disparate vascular distributions consistent with an embolic etiology. Mild atrophy and chronic small vessel changes are present. There is a probable small focus of old lacunar infarction in the central aspect of the right side of the trevor. An old focus of infarct is also present along the right thalamocapsular interface. There is no intracranial hemorrhage, mass effect, or midline shift. There are no significant additional areas of abnormal parenchymal signal. No additional foci of true restricted diffusion are identified. EXTRA-AXIAL SPACES AND MENINGES: There are no abnormal extra-axial fluid collections. [place]ydne Roz VASCULAR: The visualized portions of the intracranial carotid and vertebrobasilar systems demonstrate patent appearing flow voids. CALVARIA: Unremarkable. SINUSES: Clear, as visualized. CRANIOCERVICAL JUNCTION: Within normal limits for age. OTHER: None. _______________     IMPRESSION: 1. Several foci of acute infarction in the right cerebellar hemisphere and cerebellar vermis with an additional focus in the right thalamus and a small focus in the left frontal lobe. 2.  Mild atrophy and chronic small vessel changes with old areas of infarction, as described above.     Ct Head Wo Cont    Result Date: 7/25/2019  EXAM: CT head INDICATION: Headache and dizziness for one week. COMPARISON: None. TECHNIQUE: Axial CT imaging of the head was performed without intravenous contrast. One or more dose reduction techniques were used on this CT: automated exposure control, adjustment of the mAs and/or kVp according to patient size, and iterative reconstruction techniques. The specific techniques used on this CT exam have been documented in the patient's electronic medical record. Digital Imaging and Communications in Medicine (DICOM) format image data are available to nonaffiliated external healthcare facilities or entities on a secured, media free, reciprocally searchable basis with patient authorization for at least a 12-month period after this study. _______________ FINDINGS: BRAIN AND POSTERIOR FOSSA: There is no intracranial hemorrhage, mass effect, or midline shift. Acute/subacute appearing focal area of loss of gray-white matter junction is present in the medial posterior aspect of the right cerebellum. EXTRA-AXIAL SPACES AND MENINGES: There are no abnormal extra-axial fluid collections. CALVARIUM: Intact. SINUSES: Clear. OTHER: None. _______________     IMPRESSION: 1. Acute/subacute focal area of loss of the gray-white matter junction is present in the medial posterior aspect of the right cerebellum, concerning for evolving infarct. No acute intracranial hemorrhage. Recommend MRI of the brain for further evaluation. Cta Head Neck W Wo Cont    Result Date: 7/26/2019  EXAM:  CT angiogram of the head and neck with intravenous contrast. CLINICAL INDICATION/HISTORY:Headache and dizziness, diabetes, CVA. COMPARISON: Correlation CT head and brain MRI 7/25/2019 TECHNIQUE:  Following IV administration of nonionic contrast, helical CTA head and neck performed. Three-dimensional, maximum intensity projection, and curved planar reformations were post-processed at a dedicated outside facility ([employer]).  Stenoses are reported with reference to the downstream lumen (\"NASCET\"). One or more dose reduction techniques were used on this CT: automated exposure control, adjustment of the mAs and/or kVp according to patient's size, and iterative reconstruction techniques. The specific techniques utilized on this CT exam have been documented in the patient's electronic medical record. Digital imaging and communications and medicine (DICOM) format image data are available to nonaffiliated external healthcare facilities or entities on a secure, media free, reciprocally searchable basis with patient authorization for at least a 12 month period after this study. _______________ FINDINGS: NECK CTA:      ARTERIAL BOLUS QUALITY:  Satisfactory. AORTIC ARCH: Normal branching pattern. No proximal great vessel stenosis. RIGHT CAROTID ARTERY:  No significant common carotid or internal carotid artery stenosis. LEFT CAROTID ARTERY:  No significant common carotid or internal carotid artery stenosis. VERTEBRAL ARTERIES: The vertebral arteries are codominant. RIGHT VERTEBRAL ARTERY:  No stenosis or other vascular abnormality. LEFT VERTEBRAL ARTERY:  Mild stenosis at the origin with no other significant stenosis or vascular abnormality. LUNG APICES: No abnormal mass with nonspecific patchy groundglass density visualized upper lung zones. NECK SOFT TISSUES: No abnormal mass or adenopathy. Soft tissue air seen adjacent to the right clavicle along the course of the right subclavian vein likely iatrogenic from previous attempted central venous access. OSSEOUS: Degenerative spondylosis, no high-grade canal stenosis. BRAIN CTA:      CAROTID SIPHON AND SUPRACLINOID ICA: No significant stenosis. M1 SEGMENT MCA: No significant stenosis or aneurysm. PROXIMAL M2 SEGMENT MCA: There is focal stenosis proximal inferior division right M2 segment, no other stenosis or evidence of aneurysm.       A1 SEGMENT, ANTERIOR COMMUNICATING ARTERY AND PROXIMAL A2 SEGMENTS:           Patent anterior communicating artery with normal variant 3 separate A2 segments, no significant stenosis or aneurysm. VERTEBRAL BASILAR SYSTEM:Patent bilateral posterior communicating arteries with developmentally small right P1 segment. No significant distal vertebral or basilar stenosis. There is short segment filling defect and stenosis involving the right mid PICA. PCA:There is stenosis involving distal right P2 segment without definite filling defect. No other significant stenosis or aneurysm. DISTAL ANTERIOR CEREBRAL ARTERY:No significant stenosis or aneurysm. DISTAL MCA M2/M3 SEGMENT:No significant stenosis or aneurysm. DURAL VENOUS SINUSES: Unremarkable. BRAIN PARENCHYMA ON SOURCE DATA: Multifocal hypodensity right cerebellum compatible with areas of acute infarct is additional small focus of hypodensity right thalamus. No evidence of intracranial mass or mass effect or enhancing lesion. _______________     IMPRESSION: 1. No significant carotid stenosis. Mild stenosis origin left vertebral artery. 2. No evidence of large vessel intracranial occlusion. Initial findings discussed with patient's nurse, Rosa Lord  by Dr. Elsy Pond at 6:49 PM. 3. Short segment filling defect and stenosis right PICA suggestive of likely thromboembolism and partial recanalization. Additional areas of stenosis involving proximal right inferior division M2 segment and right P2 segment posterior cerebral artery. 4. Multifocal infarcts right cerebellum and right thalamus.

## 2019-07-27 NOTE — PROGRESS NOTES
1930: Assumed patient care from [facility]. Patient is alert and oriented to person, place, time and situation. Respiratory status is stable on room air. Vital signs are stable. MEWS score is a one. Patient ashley any pain, discomfort, nausea vomiting dizziness or anxiety. White board and fall card is updated. Bed is locked and in lowest position. Call bell, water and personal belongings are within reach. Patient has no questions, comments or concerns after bedside shift report. 2330: Patient had an uneventful shift. Respiratory status, vital signs and MEWS score remained stable. Patient was resting quietly with no signs of distress noted. Bed locked and in lowest position. Call bell water and personal belongings were within reach. Patient had no questions, comments or concerns after bedside shift report.  Bedside report given to Robert Berry R.N.

## 2019-07-27 NOTE — PROGRESS NOTES
Received OT eval and treat orders. Screened patient to identify level of assistance needed for ADLs and functional mobility/transfers. Patient presents to be at baseline and is independent in performing ADLs and functional mobility/transfers. Pt is not appropriate for skilled OT interventions at this time. Current OT orders weill be discontinued.     Thank you for this referral.    Paty Davis, OTR/L

## 2019-07-28 ENCOUNTER — HOME CARE VISIT (OUTPATIENT)
Dept: HOME HEALTH SERVICES | Facility: HOME HEALTH | Age: 64
End: 2019-07-28

## 2019-07-29 NOTE — ADT AUTH CERT NOTES
Stroke: Ischemic - Care Day 1 (7/25/2019) by Darvin Silva         Review Status Review Entered   Completed 7/26/2019 09:26       Criteria Review      Care Day: 1 Care Date: 7/25/2019 Level of Care: Telemetry    Guideline Day 1    Level Of Care    (X) Stroke unit, telemetry, floor, or ICU[C]    7/26/2019 09:26:53 EDT by Sina Linda      telemetry    Clinical Status    (X) * Clinical Indications met[D]    7/26/2019 09:26:53 EDT by Sina Linda      acute infarction in the right cerebellar hemisphere    Routes    (X) IV fluids, medications    7/26/2019 09:26:53 EDT by Sina Lidna      0.9% sod chlor cont iv 100cc/hr 0.9% sod chlor cont ivf bolus 1000mL x1    Interventions    (X) Neurology consultation    7/26/2019 09:26:53 EDT by Ayanna Reyes consult neurology    (X) DVT prophylaxis    7/26/2019 09:26:53 EDT by Sina Linda      lovenox 40mg sc x1    (X) Possible cardiac monitoring[E]    7/26/2019 09:26:53 EDT by Sina Linda      cardiac monitoring    * Milestone   Additional Notes   98.4-144/18-45-% RA    58 y.o. male who has past history of DM, presents to ER with concerns of dizziness and vertigo along with gait problem. Symptoms started about a week ago. He woke up disoriented and confused, he had fall and had to crawl to bathroom, where he vomited 3 times. Other symptoms include difficulty walking, nausea and headache. He reports that he used crack cocaine for symptoms but did not help. He denies any fever/chills, chest pain, SOB. Denies unilateral weakness or numbness. Smokes 2ppd. In ER CT showed acute/subacute CVA involving cerebellar area. Acute cerebellar CVA MRI showed Several foci of acute infarction in the right cerebellar hemisphere and cerebellar vermis with an additional focus in the right thalamus and a small focus in the left frontal lobe.     CTA of head and neck    Echo 2d doppler    Get fasting lipid panel, HbA1c    Continue with antiplatelet agent    Will use antihypertensive only if blood pressure systolic is above 609 and diastolic above 365    Regular neurochecks q4 hrly    Start the patient on iv fluids    Will keep the patient on telemetry to rule out any arrythmias    DM - continue levemir, pre meal insulin, start on diabetic diet and SSI.         Cocaine abuse - counseled.         DVT prophylaxis         PT/OT    Neurology- Initial recommendations:    Please use stroke admission order sets. 1. Aspirin 325mg po now, and then Aspirin 81mg po daily afterwards. 2. Admit to telemetry with tele monitoring    3. Neuro checks per stroke protocol    4. Permissive hypertension (do not treat if BP is not >200/110, prefer prn labetolol 5mg)    5. IV normal saline continuous infusion 100-125 ml/h    6. Euglycemia with sliding scale insulin    7. Euthermia with acetaminophen 650mg Q6h prn for fever    8. Avoid urinary catheters please. 9. CTA of head and neck    10. Echocardiogram with bubble study    11. Lipid panel    12. Hemoglobin A1c    13. SCDs and DVT prophylaxis    14. PT/OT and SLP consults    CT head - Acute/subacute focal area of loss of the gray-white matter junction is present in the medial posterior aspect of the right cerebellum, concerning for evolving infarct. No acute intracranial hemorrhage. Recommend MRI of the brain for further evaluation. MRI brain - 1.  Several foci of acute infarction in the right cerebellar hemisphere and cerebellar vermis with an additional focus in the right thalamus and a small focus in the left frontal lobe. 2.  Mild atrophy and chronic small vessel changes with old areas of infarction, as described above. CTA head - 1. No significant carotid stenosis. Mild stenosis origin left vertebral artery. 2. No evidence of large vessel intracranial occlusion.  Initial findings discussed with patient's nurse, [facility] Dr. Per Lancaster at 6:49 PM. 3. Short segment filling defect and stenosis right PICA suggestive of likely thromboembolism and partial recanalization. Additional areas of stenosis involving proximal right inferior division M2 segment and right P2 segment posterior cerebral artery. 4. Multifocal infarcts right cerebellum and right thalamus.     glu 209 bun 28 hgb 7 UDS pos cocaine    PT/OT eval and treat    consult diabetes management    diabetic diet    sliding scale insulin 4units sc x1    aspirin 325mg po x1

## 2019-07-30 ENCOUNTER — HOME CARE VISIT (OUTPATIENT)
Dept: SCHEDULING | Facility: HOME HEALTH | Age: 64
End: 2019-07-30
Payer: COMMERCIAL

## 2019-07-30 ENCOUNTER — HOME CARE VISIT (OUTPATIENT)
Dept: HOME HEALTH SERVICES | Facility: HOME HEALTH | Age: 64
End: 2019-07-30

## 2019-07-30 VITALS
HEART RATE: 75 BPM | OXYGEN SATURATION: 96 % | SYSTOLIC BLOOD PRESSURE: 156 MMHG | DIASTOLIC BLOOD PRESSURE: 88 MMHG | TEMPERATURE: 99.2 F | RESPIRATION RATE: 16 BRPM

## 2019-07-30 PROCEDURE — G0299 HHS/HOSPICE OF RN EA 15 MIN: HCPCS

## 2019-07-30 PROCEDURE — 400013 HH SOC

## 2019-07-31 ENCOUNTER — HOME CARE VISIT (OUTPATIENT)
Dept: SCHEDULING | Facility: HOME HEALTH | Age: 64
End: 2019-07-31
Payer: COMMERCIAL

## 2019-07-31 ENCOUNTER — HOME CARE VISIT (OUTPATIENT)
Dept: HOME HEALTH SERVICES | Facility: HOME HEALTH | Age: 64
End: 2019-07-31
Payer: COMMERCIAL

## 2019-07-31 VITALS
RESPIRATION RATE: 16 BRPM | TEMPERATURE: 99 F | HEART RATE: 71 BPM | DIASTOLIC BLOOD PRESSURE: 90 MMHG | SYSTOLIC BLOOD PRESSURE: 149 MMHG | OXYGEN SATURATION: 98 %

## 2019-07-31 PROCEDURE — G0151 HHCP-SERV OF PT,EA 15 MIN: HCPCS

## 2019-08-01 ENCOUNTER — HOME CARE VISIT (OUTPATIENT)
Dept: SCHEDULING | Facility: HOME HEALTH | Age: 64
End: 2019-08-01
Payer: COMMERCIAL

## 2019-08-05 ENCOUNTER — HOME CARE VISIT (OUTPATIENT)
Dept: HOME HEALTH SERVICES | Facility: HOME HEALTH | Age: 64
End: 2019-08-05
Payer: COMMERCIAL

## 2019-08-20 ENCOUNTER — APPOINTMENT (OUTPATIENT)
Dept: PHYSICAL THERAPY | Age: 64
End: 2019-08-20

## 2019-11-07 ENCOUNTER — APPOINTMENT (OUTPATIENT)
Dept: GENERAL RADIOLOGY | Age: 64
End: 2019-11-07
Attending: EMERGENCY MEDICINE
Payer: COMMERCIAL

## 2019-11-07 ENCOUNTER — APPOINTMENT (OUTPATIENT)
Dept: CT IMAGING | Age: 64
End: 2019-11-07
Attending: EMERGENCY MEDICINE
Payer: COMMERCIAL

## 2019-11-07 ENCOUNTER — HOSPITAL ENCOUNTER (EMERGENCY)
Age: 64
Discharge: ANOTHER HOSPITAL | End: 2019-11-07
Attending: EMERGENCY MEDICINE
Payer: COMMERCIAL

## 2019-11-07 VITALS
RESPIRATION RATE: 16 BRPM | BODY MASS INDEX: 28.14 KG/M2 | HEIGHT: 69 IN | OXYGEN SATURATION: 99 % | HEART RATE: 78 BPM | TEMPERATURE: 99.1 F | SYSTOLIC BLOOD PRESSURE: 128 MMHG | WEIGHT: 190 LBS | DIASTOLIC BLOOD PRESSURE: 68 MMHG

## 2019-11-07 DIAGNOSIS — S06.5XAA SUBDURAL HEMATOMA: ICD-10-CM

## 2019-11-07 DIAGNOSIS — I62.9 INTRACRANIAL BLEED (HCC): Primary | ICD-10-CM

## 2019-11-07 LAB
ABO + RH BLD: NORMAL
ALBUMIN SERPL-MCNC: 3.7 G/DL (ref 3.4–5)
ALBUMIN/GLOB SERPL: 0.9 {RATIO} (ref 0.8–1.7)
ALP SERPL-CCNC: 93 U/L (ref 45–117)
ALT SERPL-CCNC: 19 U/L (ref 16–61)
ANION GAP SERPL CALC-SCNC: 8 MMOL/L (ref 3–18)
APTT PPP: 29.7 SEC (ref 23–36.4)
AST SERPL-CCNC: 13 U/L (ref 10–38)
BASOPHILS # BLD: 0 K/UL (ref 0–0.1)
BASOPHILS NFR BLD: 0 % (ref 0–2)
BILIRUB SERPL-MCNC: 1.2 MG/DL (ref 0.2–1)
BLOOD GROUP ANTIBODIES SERPL: NORMAL
BUN SERPL-MCNC: 13 MG/DL (ref 7–18)
BUN/CREAT SERPL: 12 (ref 12–20)
CALCIUM SERPL-MCNC: 8.7 MG/DL (ref 8.5–10.1)
CHLORIDE SERPL-SCNC: 103 MMOL/L (ref 100–111)
CO2 SERPL-SCNC: 23 MMOL/L (ref 21–32)
CREAT SERPL-MCNC: 1.09 MG/DL (ref 0.6–1.3)
DIFFERENTIAL METHOD BLD: ABNORMAL
EOSINOPHIL # BLD: 0 K/UL (ref 0–0.4)
EOSINOPHIL NFR BLD: 0 % (ref 0–5)
ERYTHROCYTE [DISTWIDTH] IN BLOOD BY AUTOMATED COUNT: 13.4 % (ref 11.6–14.5)
ETHANOL SERPL-MCNC: <3 MG/DL (ref 0–3)
GLOBULIN SER CALC-MCNC: 3.9 G/DL (ref 2–4)
GLUCOSE BLD STRIP.AUTO-MCNC: 236 MG/DL (ref 70–110)
GLUCOSE SERPL-MCNC: 240 MG/DL (ref 74–99)
HCT VFR BLD AUTO: 43.8 % (ref 36–48)
HGB BLD-MCNC: 14.4 G/DL (ref 13–16)
LYMPHOCYTES # BLD: 2 K/UL (ref 0.9–3.6)
LYMPHOCYTES NFR BLD: 17 % (ref 21–52)
MCH RBC QN AUTO: 27.9 PG (ref 24–34)
MCHC RBC AUTO-ENTMCNC: 32.9 G/DL (ref 31–37)
MCV RBC AUTO: 84.7 FL (ref 74–97)
MONOCYTES # BLD: 1.2 K/UL (ref 0.05–1.2)
MONOCYTES NFR BLD: 10 % (ref 3–10)
NEUTS SEG # BLD: 8.4 K/UL (ref 1.8–8)
NEUTS SEG NFR BLD: 73 % (ref 40–73)
PLATELET # BLD AUTO: 229 K/UL (ref 135–420)
PMV BLD AUTO: 11.1 FL (ref 9.2–11.8)
POTASSIUM SERPL-SCNC: 4.1 MMOL/L (ref 3.5–5.5)
PROT SERPL-MCNC: 7.6 G/DL (ref 6.4–8.2)
RBC # BLD AUTO: 5.17 M/UL (ref 4.7–5.5)
SODIUM SERPL-SCNC: 134 MMOL/L (ref 136–145)
SPECIMEN EXP DATE BLD: NORMAL
WBC # BLD AUTO: 11.7 K/UL (ref 4.6–13.2)

## 2019-11-07 PROCEDURE — 96376 TX/PRO/DX INJ SAME DRUG ADON: CPT

## 2019-11-07 PROCEDURE — 70450 CT HEAD/BRAIN W/O DYE: CPT

## 2019-11-07 PROCEDURE — 93005 ELECTROCARDIOGRAM TRACING: CPT

## 2019-11-07 PROCEDURE — 85025 COMPLETE CBC W/AUTO DIFF WBC: CPT

## 2019-11-07 PROCEDURE — 74011000258 HC RX REV CODE- 258: Performed by: EMERGENCY MEDICINE

## 2019-11-07 PROCEDURE — 99285 EMERGENCY DEPT VISIT HI MDM: CPT

## 2019-11-07 PROCEDURE — 82962 GLUCOSE BLOOD TEST: CPT

## 2019-11-07 PROCEDURE — 96375 TX/PRO/DX INJ NEW DRUG ADDON: CPT

## 2019-11-07 PROCEDURE — 74011250636 HC RX REV CODE- 250/636: Performed by: EMERGENCY MEDICINE

## 2019-11-07 PROCEDURE — 80307 DRUG TEST PRSMV CHEM ANLYZR: CPT

## 2019-11-07 PROCEDURE — 85730 THROMBOPLASTIN TIME PARTIAL: CPT

## 2019-11-07 PROCEDURE — 80053 COMPREHEN METABOLIC PANEL: CPT

## 2019-11-07 PROCEDURE — 96374 THER/PROPH/DIAG INJ IV PUSH: CPT

## 2019-11-07 PROCEDURE — 86900 BLOOD TYPING SEROLOGIC ABO: CPT

## 2019-11-07 PROCEDURE — 71045 X-RAY EXAM CHEST 1 VIEW: CPT

## 2019-11-07 RX ORDER — MORPHINE SULFATE 4 MG/ML
4 INJECTION INTRAVENOUS
Status: COMPLETED | OUTPATIENT
Start: 2019-11-07 | End: 2019-11-07

## 2019-11-07 RX ORDER — NICARDIPINE HYDROCHLORIDE 0.1 MG/ML
5-15 INJECTION INTRAVENOUS
Status: DISCONTINUED | OUTPATIENT
Start: 2019-11-07 | End: 2019-11-07 | Stop reason: HOSPADM

## 2019-11-07 RX ORDER — MORPHINE SULFATE 4 MG/ML
2 INJECTION INTRAVENOUS
Status: COMPLETED | OUTPATIENT
Start: 2019-11-07 | End: 2019-11-07

## 2019-11-07 RX ORDER — ONDANSETRON 2 MG/ML
8 INJECTION INTRAMUSCULAR; INTRAVENOUS
Status: COMPLETED | OUTPATIENT
Start: 2019-11-07 | End: 2019-11-07

## 2019-11-07 RX ADMIN — MORPHINE SULFATE 4 MG: 4 INJECTION INTRAVENOUS at 11:23

## 2019-11-07 RX ADMIN — MORPHINE SULFATE 2 MG: 4 INJECTION INTRAVENOUS at 10:08

## 2019-11-07 RX ADMIN — LEVETIRACETAM 750 MG: 500 INJECTION, SOLUTION, CONCENTRATE INTRAVENOUS at 11:01

## 2019-11-07 RX ADMIN — ONDANSETRON 8 MG: 2 INJECTION INTRAMUSCULAR; INTRAVENOUS at 09:57

## 2019-11-07 NOTE — ED NOTES
Telephone report given to Maryanne Jacobo RN at [facility], all questions answered at this time

## 2019-11-07 NOTE — ED PROVIDER NOTES
EMERGENCY DEPARTMENT HISTORY AND PHYSICAL EXAM    Date: 11/7/2019  Patient Name: Rae Cantrell    History of Presenting Illness     Chief Complaint   Patient presents with    Extremity Weakness         History Provided By: Patient and Patient's Son    Alfredo Cordero is a 58 y.o. male with PMHX of CVA, diabetes, crack cocaine abuse, tobacco use who presents to the emergency department C/O strokelike symptoms. Patient arrives emergency department by his son. Patient's son states that his father has been in the state of his normal health however last night around 9 PM patient was noted to be confused. His son states that he was acting strangely and having difficulty finding words. Patient was requesting his son to turn off the TV however the TV was not on and is having difficulty finding words to say. This morning the son noted that patient had a right-sided facial droop, ataxia, and was still confused. This was noted at 8:30 AM this morning approximately 1 hour prior to arrival.  Patient had a stroke in July of this year was not given TPA, MRI demonstrated multiple infarcts right cerebellar and right cerebral vermis. PCP: Galileo Whitaker MD    Current Facility-Administered Medications   Medication Dose Route Frequency Provider Last Rate Last Dose    niCARdipine in Saline (CARDENE) 0.1mg/mL 200 ml premix infusion  5-15 mg/hr IntraVENous TITRATE Alex Shankar MD        levETIRAcetam (KEPPRA) 750 mg in 0.9% sodium chloride 100 mL IVPB  750 mg IntraVENous NOW Alex Shankar MD         Current Outpatient Medications   Medication Sig Dispense Refill    diphenhydrAMINE (BENADRYL) 25 mg tablet Take 50 mg by mouth nightly as needed for Sleep.  traZODone (DESYREL) 300 mg tablet Take 300 mg by mouth nightly.  mirabegron ER (MYRBETRIQ) 50 mg ER tablet Take 50 mg by mouth daily.  metFORMIN (GLUCOPHAGE) 500 mg tablet Take 1,000 mg by mouth two (2) times daily (with meals).  ibuprofen (MOTRIN) 200 mg tablet Take 400 mg by mouth every six (6) hours as needed for Pain.  amLODIPine (NORVASC) 5 mg tablet Take 1 Tab by mouth daily. 30 Tab 0    aspirin delayed-release 325 mg tablet Take 1 Tab by mouth daily. 30 Tab 0    atorvastatin (LIPITOR) 80 mg tablet Take 1 Tab by mouth daily. 30 Tab 0    insulin lispro (HUMALOG KWIKPEN INSULIN) 100 unit/mL kwikpen inject 20 units subcutaneously before meals      insulin detemir U-100 (LEVEMIR FLEXTOUCH U-100 INSULN) 100 unit/mL (3 mL) inpn inject 30 units subcutaneously every evening         Past History     Past Medical History:  Past Medical History:   Diagnosis Date    Diabetes ([facility] [address]     [facility]roke [facility])        Past Surgical History:  History reviewed. No pertinent surgical history. Family History:  History reviewed. No pertinent family history. Social History:  Social History     Tobacco Use    Smoking status: Current Every Day Smoker     Packs/day: 3.00    Smokeless tobacco: Never Used   Substance Use Topics    Alcohol use: Never     Frequency: Never    Drug use: Yes     Types: Cocaine       Allergies:  No Known Allergies      Review of Systems   Review of Systems   Unable to perform ROS: Acuity of condition   Cardiovascular: Negative for chest pain. Psychiatric/Behavioral: Positive for confusion. Physical Exam     Vitals:    11/07/19 1022 11/07/19 1030 11/07/19 1031 11/07/19 1045   BP: 155/81 148/84  138/74   Pulse: 65  66 65   Resp: 22   17   Temp:       SpO2: 98%  98% 95%   Weight:       Height:         Physical Exam   Constitutional: He appears lethargic. No distress. Disheveled, yawning    HENT:   Head: Normocephalic and atraumatic. No teeth, asymmetric mouth (appears to droop right) with intact nasolabial folds   Eyes: Pupils are equal, round, and reactive to light. Conjunctivae and EOM are normal.   Neck: Normal range of motion. Neck supple. No JVD present. Cardiovascular: Normal rate and regular rhythm. Pulmonary/Chest: Effort normal and breath sounds normal.   Abdominal: Soft. He exhibits no distension. There is no tenderness. Musculoskeletal: Normal range of motion. He exhibits no edema, tenderness or deformity. Neurological: He has normal strength. He appears lethargic. He is disoriented (Oriented to person, place (hospital), not time or month). He displays no tremor. No cranial nerve deficit or sensory deficit. Skin: He is not diaphoretic. Diagnostic Study Results     Labs -     Recent Results (from the past 12 hour(s))   CBC WITH AUTOMATED DIFF    Collection Time: 11/07/19  9:20 AM   Result Value Ref Range    WBC 11.7 4.6 - 13.2 K/uL    RBC 5.17 4.70 - 5.50 M/uL    HGB 14.4 13.0 - 16.0 g/dL    HCT 43.8 36.0 - 48.0 %    MCV 84.7 74.0 - 97.0 FL    MCH 27.9 24.0 - 34.0 PG    MCHC 32.9 31.0 - 37.0 g/dL    RDW 13.4 11.6 - 14.5 %    PLATELET 726 181 - 681 K/uL    MPV 11.1 9.2 - 11.8 FL    NEUTROPHILS 73 40 - 73 %    LYMPHOCYTES 17 (L) 21 - 52 %    MONOCYTES 10 3 - 10 %    EOSINOPHILS 0 0 - 5 %    BASOPHILS 0 0 - 2 %    ABS. NEUTROPHILS 8.4 (H) 1.8 - 8.0 K/UL    ABS. LYMPHOCYTES 2.0 0.9 - 3.6 K/UL    ABS. MONOCYTES 1.2 0.05 - 1.2 K/UL    ABS. EOSINOPHILS 0.0 0.0 - 0.4 K/UL    ABS. BASOPHILS 0.0 0.0 - 0.1 K/UL    DF AUTOMATED     METABOLIC PANEL, COMPREHENSIVE    Collection Time: 11/07/19  9:20 AM   Result Value Ref Range    Sodium 134 (L) 136 - 145 mmol/L    Potassium 4.1 3.5 - 5.5 mmol/L    Chloride 103 100 - 111 mmol/L    CO2 23 21 - 32 mmol/L    Anion gap 8 3.0 - 18 mmol/L    Glucose 240 (H) 74 - 99 mg/dL    BUN 13 7.0 - 18 MG/DL    Creatinine 1.09 0.6 - 1.3 MG/DL    BUN/Creatinine ratio 12 12 - 20      GFR est AA >60 >60 ml/min/1.73m2    GFR est non-AA >60 >60 ml/min/1.73m2    Calcium 8.7 8.5 - 10.1 MG/DL    Bilirubin, total 1.2 (H) 0.2 - 1.0 MG/DL    ALT (SGPT) 19 16 - 61 U/L    AST (SGOT) 13 10 - 38 U/L    Alk.  phosphatase 93 45 - 117 U/L    Protein, total 7.6 6.4 - 8.2 g/dL    Albumin 3.7 3.4 - 5.0 g/dL    Globulin 3.9 2.0 - 4.0 g/dL    A-G Ratio 0.9 0.8 - 1.7     PTT    Collection Time: 11/07/19  9:20 AM   Result Value Ref Range    aPTT 29.7 23.0 - 36.4 SEC   ETHYL ALCOHOL    Collection Time: 11/07/19  9:20 AM   Result Value Ref Range    ALCOHOL(ETHYL),SERUM <3 0 - 3 MG/DL   GLUCOSE, POC    Collection Time: 11/07/19  9:22 AM   Result Value Ref Range    Glucose (POC) 236 (H) 70 - 110 mg/dL   EKG, 12 LEAD, INITIAL    Collection Time: 11/07/19  9:38 AM   Result Value Ref Range    Ventricular Rate 68 BPM    Atrial Rate 68 BPM    P-R Interval 168 ms    QRS Duration 82 ms    Q-T Interval 382 ms    QTC Calculation (Bezet) 406 ms    Calculated P Axis 48 degrees    Calculated R Axis 24 degrees    Calculated T Axis 43 degrees    Diagnosis       Normal sinus rhythm  Normal ECG  When compared with ECG of 25-JUL-2019 12:19,  No significant change was found         Radiologic Studies -   XR CHEST PORT   Final Result   IMPRESSION:      Underexpanded lungs without superimposed acute radiographic cardiopulmonary   abnormality. CT HEAD WO CONT   Final Result   IMPRESSION:    1. Interval development of 4.5 cm intracerebral hemorrhage, left temporal lobe,   with local periatrial intraventricular extension. Major potential causes include   hypertension, vascular malformation, amyloid angiopathy. No evidence of   hydrocephalus. 2. Interval associated development of small left frontal convexity subdural   hematoma, with minimal intrafissural and tentorial extension. Minimal local mass   effect; no evidence of midline shift. Given findings in (1), consider hemorrhage   and subsequent fall trauma. 1. Tiny old lacunar central gray and small right cerebellar cortical infarcts. Critical results:       The significant nature of the findings above was immediately discussed   with/relayed to and read back/verbally understood, effectively communicated to   the referring clinician, Dr. Donny Gayle, 0945 hours, 11/7/2019, ],                        CT Results  (Last 48 hours)               11/07/19 0934  CT HEAD WO CONT Final result    Impression:  IMPRESSION:    1. Interval development of 4.5 cm intracerebral hemorrhage, left temporal lobe,   with local periatrial intraventricular extension. Major potential causes include   hypertension, vascular malformation, amyloid angiopathy. No evidence of   hydrocephalus. 2. Interval associated development of small left frontal convexity subdural   hematoma, with minimal intrafissural and tentorial extension. Minimal local mass   effect; no evidence of midline shift. Given findings in (1), consider hemorrhage   and subsequent fall trauma. 1. Tiny old lacunar central gray and small right cerebellar cortical infarcts. Critical results: The significant nature of the findings above was immediately discussed   with/relayed to and read back/verbally understood, effectively communicated to   the referring clinician, Dr. Ivon Gerber, 0945 hours, 11/7/2019, ],                           Narrative:  CT HEAD W/O CONTRAST       History: Altered mental status possible stroke       CT Technique:       Serial axial noncontrast head CT was performed from base of skull to vertex. Coronal and sagittal reformats performed, as needed. Dose reduction technique:    Automated exposure control, adjustment of the mAs and/or kVp according to the   patient 's size, and iterative reconstruction techniques were used. Specifics   for this study were placed by technologist staff into the patient's electronic   medical record. Approximate dose, if determined, reference air kerma:   Digital imaging and Communication in Medicine [DICOM] format image data are   available to nonaffiliated external healthcare facilities or entities on a   secure media free reciprocally searchable basis, with patient authorization, for   at least a 12 month period after this study.  If not available when requested,   the health care system, which is responsible for storage archival and delivery,   should be contacted directly. Comparison prior CT and MR head exams,  7/25/2019           CT Findings: There is interval appearance of an elliptical hyperdense hemorrhage in the   inferior left temporal lobe extending to involve the temporal occipital and   parahippocampal gyri with a thin rind halo of vasogenic edema, measuring   approximately 4.5 x 2.5 x 3 cm roughly 15 cc volume. . This is associated with   intraventricular extension of hemorrhage into the left periatrial and occipital   horn lateral ventricle. Critical result       In addition, there is a crescentic roughly 6.5 cm long and 1 cm wide hyperdense   subdural hematoma in the left frontal supratentorial convexity. Very minimal   amount of additional hemorrhage extends into the interhemispheric fissure and   along the left tentorium cerebelli. Critical result       There is minimal local mass effect on the adjacent left middle and inferior   frontal gyri, but no midline shift and only minimal flattening of the lateral   margin of the left lateral ventricle. No evidence of ventriculomegaly to suggest   hydrocephalus       There are punctate well-defined hypodensities in both globus pallidus extending   into the genu of internal capsules, chronic findings stable from prior MR.       BRAIN [CEREBRUM/POSTERIOR FOSSA]:   Supratentorial  parenchyma appears otherwise of normal density. There is a small bandlike chronic hypodensity in the right cerebellar hemisphere   also present on prior MR. Posterior fossa parenchyma appears otherwise   unremarkable. EXTRAAXIAL AND MENINGES:   Cerebral  and cerebellar sulci and the ventricles appear otherwise within normal   limits in size and configuration for patient age.        There is mild intracranial atherosclerosis       CALVARIUM:       The visible bony calvarium appears unremarkable. MISC:    The visible included paranasal sinuses and petrous mastoid air cells appear   well developed and aerated. CXR Results  (Last 48 hours)               11/07/19 1013  XR CHEST PORT Final result    Impression:  IMPRESSION:       Underexpanded lungs without superimposed acute radiographic cardiopulmonary   abnormality. Narrative:  EXAM: XR CHEST PORT       CLINICAL INDICATION/HISTORY: Stroke with altered mental status   -Additional: None       COMPARISON: Radiographs performed 9/12/2016       TECHNIQUE: Frontal view of the chest       _______________       FINDINGS:       HEART AND MEDIASTINUM: Cardiac size and mediastinal contours appear within   normal limits for AP technique. LUNGS AND PLEURAL SPACES: The lungs are underexpanded. There is no focal   pneumonic consolidation. No evidence of pneumothorax or pleural effusion. BONY THORAX AND SOFT TISSUES: No acute osseous abnormality present.       _______________                 Medications given in the ED-  Medications   niCARdipine in Saline (CARDENE) 0.1mg/mL 200 ml premix infusion (has no administration in time range)   levETIRAcetam (KEPPRA) 750 mg in 0.9% sodium chloride 100 mL IVPB (has no administration in time range)   ondansetron (ZOFRAN) injection 8 mg (8 mg IntraVENous Given 11/7/19 0957)   morphine injection 2 mg (2 mg IntraVENous Given 11/7/19 1008)         Medical Decision Making   I am the first provider for this patient. I reviewed the vital signs, available nursing notes, past medical history, past surgical history, family history and social history. Vital Signs-Reviewed the patient's vital signs.     Pulse Oximetry Analysis - 100% on RA     Cardiac Monitor:  Rate: 72 bpm  Rhythm: NSR    EKG interpretation: (Preliminary)  EKG read by Dr. Vic Anthony at 0179   NSR rate 68, normal intervals, normal axis    Records Reviewed: Nursing Notes, Old Medical Records, Previous Radiology Studies and Previous Laboratory Studies    Provider Notes (Medical Decision Making): Arvin Tellez is a 58 y.o. male with concerning stroke like symptoms    Procedures:  Procedures    ED Course:   9:32 AM  Patient seen immediately on arrival.  He has had change in mental status over the past 12 hours with witnessed focal neurological deficits by his son that occurred approximately 1 hour prior to arrival.  On my evaluation patient is awake but lethargic, confused. He has facial asymmetry but with intact nasolabial folds. He does not have any focal neurological deficits on my exam although speech does appear to slurred and he has some word finding difficulties. Is difficult to tell if this is acute or in keeping with his prior infarct. Stroke alert called    9:46 AM  Discussed with radiology. Patient has 4 cm left temporal intraparachamyl hemorrhage and also left subdural hematoma. No midline shift    10:07 AM  Discussed with Dr. Yasir Hernandez, Neurosurgery at [facility], [address] and transfer. 10:15 AM  Discussed with Dr. Opal Vega, interventional neurology, will admit directly at [facility]. I have requested CDs of imaging to be prepared. Patient remains stable -pressure is appropriate, systolic 742/63. Cardene is ordered but has not been required yet for blood pressure control. Patient prophylactically given Zofran to avoid increase in her current pressure from nausea. Given small dose of morphine for pain control. Had of bed elevated 45 degrees. Patient and  Family members updated at bedside of plan of care. Diagnosis and Disposition     10:17 AM  I have spent 45 minutes of critical care time involved in lab review, consultations with specialist, family decision-making, and documentation. During this entire length of time I was immediately available to the patient. Critical Care:   The reason for providing this level of medical care for this critically ill patient was due a critical illness that impaired one or more vital organ systems such that there was a high probability of imminent or life threatening deterioration in the patients condition. This care involved high complexity decision making to assess, manipulate, and support vital system functions, to treat this degreee vital organ system failure and to prevent further life threatening deterioration of the patients condition. CLINICAL IMPRESSION:    1. Intracranial bleed (HCC)    2. Subdural hematoma (HCC)        PLAN:  1. Transfer to [facility]  2. Current Discharge Medication List        3. Follow-up Information     Follow up With Specialties Details Why Contact Info    Oralia Whitaker, [address]  [phone]          _______________________________      Please note that this dictation was completed with "[employer] the computer voice recognition software. Quite often unanticipated grammatical, syntax, homophones, and other interpretive errors are inadvertently transcribed by the computer software. Please disregard these errors. Please excuse any errors that have escaped final proofreading.

## 2019-11-07 NOTE — ED NOTES
Dr Leann Mcdonald at bedside, based on current vital signs, RN given verbal order to hold cardene iv drip

## 2019-11-07 NOTE — ED NOTES
Patient transported via [address] [place] [address] to [facility] with JP and all belongings

## 2019-11-07 NOTE — ED TRIAGE NOTES
Per son \" He was acting weird last night around 9 pm like really confused but I think he is having a stroke. He seems more unbalanced then normal and face looks different this am when he came downstairs. \"

## 2019-11-09 LAB
ATRIAL RATE: 68 BPM
CALCULATED P AXIS, ECG09: 48 DEGREES
CALCULATED R AXIS, ECG10: 24 DEGREES
CALCULATED T AXIS, ECG11: 43 DEGREES
DIAGNOSIS, 93000: NORMAL
P-R INTERVAL, ECG05: 168 MS
Q-T INTERVAL, ECG07: 382 MS
QRS DURATION, ECG06: 82 MS
QTC CALCULATION (BEZET), ECG08: 406 MS
VENTRICULAR RATE, ECG03: 68 BPM